# Patient Record
Sex: FEMALE | Race: BLACK OR AFRICAN AMERICAN | NOT HISPANIC OR LATINO
[De-identification: names, ages, dates, MRNs, and addresses within clinical notes are randomized per-mention and may not be internally consistent; named-entity substitution may affect disease eponyms.]

---

## 2021-01-06 ENCOUNTER — APPOINTMENT (OUTPATIENT)
Dept: SURGERY | Facility: CLINIC | Age: 31
End: 2021-01-06
Payer: MEDICAID

## 2021-01-06 VITALS
HEIGHT: 64 IN | OXYGEN SATURATION: 99 % | TEMPERATURE: 96.9 F | BODY MASS INDEX: 31.07 KG/M2 | SYSTOLIC BLOOD PRESSURE: 125 MMHG | WEIGHT: 182 LBS | DIASTOLIC BLOOD PRESSURE: 81 MMHG | HEART RATE: 71 BPM

## 2021-01-06 DIAGNOSIS — K42.0 UMBILICAL HERNIA WITH OBSTRUCTION, W/OUT GANGRENE: ICD-10-CM

## 2021-01-06 PROBLEM — Z00.00 ENCOUNTER FOR PREVENTIVE HEALTH EXAMINATION: Status: ACTIVE | Noted: 2021-01-06

## 2021-01-06 PROCEDURE — 99072 ADDL SUPL MATRL&STAF TM PHE: CPT

## 2021-01-06 PROCEDURE — 99203 OFFICE O/P NEW LOW 30 MIN: CPT

## 2021-01-06 NOTE — HISTORY OF PRESENT ILLNESS
[de-identified] : 31 yo female with h/o  x3 as well as a known umbilical hernia presents today for evaluation of increased pain around the umbilicus. She denies any nausea or vomiting. No issues with BM's. She tolerates a diet without issues. Patient states she present to [facility] and diagnosed with an umbilical hernia. She had another CT at [facility] which revealed  an umbilical hernia as well as a lower abdominal wall hernia containing bowel.

## 2021-01-06 NOTE — PHYSICAL EXAM
[Respiratory Effort] : normal respiratory effort [Alert] : alert [Oriented to Person] : oriented to person [Oriented to Place] : oriented to place [Oriented to Time] : oriented to time [Calm] : calm [JVD] : no jugular venous distention  [Abdominal Masses] : No abdominal masses [Abdomen Tenderness] : ~T ~M No abdominal tenderness [de-identified] : FALGUNI DENISE NAD [de-identified] : EOMI [de-identified] : soft NT ND + incarcerated umbilical hernia\par no lower abdominal wall defects appreciated

## 2021-01-06 NOTE — ASSESSMENT
[FreeTextEntry1] : 31 yo female in pain due to an incarcerated umbilical hernia. Will schedule out patient repair. CT findings do not correlate with PE findings.

## 2021-01-08 DIAGNOSIS — Z01.818 ENCOUNTER FOR OTHER PREPROCEDURAL EXAMINATION: ICD-10-CM

## 2021-01-09 ENCOUNTER — APPOINTMENT (OUTPATIENT)
Dept: DISASTER EMERGENCY | Facility: CLINIC | Age: 31
End: 2021-01-09

## 2021-01-09 ENCOUNTER — LABORATORY RESULT (OUTPATIENT)
Age: 31
End: 2021-01-09

## 2021-01-11 ENCOUNTER — TRANSCRIPTION ENCOUNTER (OUTPATIENT)
Age: 31
End: 2021-01-11

## 2021-01-12 ENCOUNTER — OUTPATIENT (OUTPATIENT)
Dept: OUTPATIENT SERVICES | Facility: HOSPITAL | Age: 31
LOS: 1 days | Discharge: HOME | End: 2021-01-12
Payer: MEDICAID

## 2021-01-12 ENCOUNTER — APPOINTMENT (OUTPATIENT)
Dept: SURGERY | Facility: HOSPITAL | Age: 31
End: 2021-01-12

## 2021-01-12 ENCOUNTER — RESULT REVIEW (OUTPATIENT)
Age: 31
End: 2021-01-12

## 2021-01-12 VITALS
RESPIRATION RATE: 15 BRPM | OXYGEN SATURATION: 98 % | TEMPERATURE: 98 F | DIASTOLIC BLOOD PRESSURE: 96 MMHG | HEART RATE: 68 BPM | SYSTOLIC BLOOD PRESSURE: 135 MMHG

## 2021-01-12 VITALS
DIASTOLIC BLOOD PRESSURE: 86 MMHG | RESPIRATION RATE: 15 BRPM | SYSTOLIC BLOOD PRESSURE: 124 MMHG | HEART RATE: 72 BPM | OXYGEN SATURATION: 100 %

## 2021-01-12 DIAGNOSIS — Z98.891 HISTORY OF UTERINE SCAR FROM PREVIOUS SURGERY: Chronic | ICD-10-CM

## 2021-01-12 LAB — HCG UR QL: NEGATIVE — SIGNIFICANT CHANGE UP

## 2021-01-12 PROCEDURE — 22902 EXC ABD LES SC < 3 CM: CPT | Mod: AS

## 2021-01-12 PROCEDURE — 22900 EXC ABDL TUM DEEP < 5 CM: CPT

## 2021-01-12 PROCEDURE — 49585: CPT

## 2021-01-12 PROCEDURE — 49585: CPT | Mod: AS

## 2021-01-12 PROCEDURE — 88305 TISSUE EXAM BY PATHOLOGIST: CPT | Mod: 26

## 2021-01-12 RX ORDER — ACETAMINOPHEN 500 MG
1000 TABLET ORAL ONCE
Refills: 0 | Status: COMPLETED | OUTPATIENT
Start: 2021-01-12 | End: 2021-01-12

## 2021-01-12 RX ORDER — SODIUM CHLORIDE 9 MG/ML
1000 INJECTION, SOLUTION INTRAVENOUS
Refills: 0 | Status: DISCONTINUED | OUTPATIENT
Start: 2021-01-12 | End: 2021-01-12

## 2021-01-12 RX ORDER — FENTANYL CITRATE 50 UG/ML
25 INJECTION INTRAVENOUS
Refills: 0 | Status: DISCONTINUED | OUTPATIENT
Start: 2021-01-12 | End: 2021-01-12

## 2021-01-12 RX ORDER — IBUPROFEN 200 MG
1 TABLET ORAL
Qty: 56 | Refills: 0
Start: 2021-01-12 | End: 2021-01-25

## 2021-01-12 RX ORDER — ONDANSETRON 8 MG/1
4 TABLET, FILM COATED ORAL ONCE
Refills: 0 | Status: COMPLETED | OUTPATIENT
Start: 2021-01-12 | End: 2021-01-12

## 2021-01-12 RX ORDER — FENTANYL CITRATE 50 UG/ML
50 INJECTION INTRAVENOUS
Refills: 0 | Status: DISCONTINUED | OUTPATIENT
Start: 2021-01-12 | End: 2021-01-12

## 2021-01-12 RX ORDER — OXYCODONE HYDROCHLORIDE 5 MG/1
5 TABLET ORAL ONCE
Refills: 0 | Status: DISCONTINUED | OUTPATIENT
Start: 2021-01-12 | End: 2021-01-12

## 2021-01-12 RX ORDER — ACETAMINOPHEN 500 MG
2 TABLET ORAL
Qty: 80 | Refills: 0
Start: 2021-01-12 | End: 2021-01-21

## 2021-01-12 RX ADMIN — FENTANYL CITRATE 50 MICROGRAM(S): 50 INJECTION INTRAVENOUS at 12:49

## 2021-01-12 RX ADMIN — Medication 400 MILLIGRAM(S): at 12:59

## 2021-01-12 RX ADMIN — SODIUM CHLORIDE 75 MILLILITER(S): 9 INJECTION, SOLUTION INTRAVENOUS at 12:59

## 2021-01-12 RX ADMIN — ONDANSETRON 4 MILLIGRAM(S): 8 TABLET, FILM COATED ORAL at 13:47

## 2021-01-12 NOTE — BRIEF OPERATIVE NOTE - NSICDXBRIEFPOSTOP_GEN_ALL_CORE_FT
POST-OP DIAGNOSIS:  Abdominal wall mass 12-Jan-2021 12:53:46  Evaristo Hutton  Umbilical hernia 12-Jan-2021 12:53:24  Evaristo Hutton

## 2021-01-12 NOTE — H&P ADULT - HISTORY OF PRESENT ILLNESS
31 yo healthy female with h/o  x3 presents for repair of a symptomatic umbilical hernia.   (see allscripts chart for details).

## 2021-01-12 NOTE — ASU DISCHARGE PLAN (ADULT/PEDIATRIC) - CALL YOUR DOCTOR IF YOU HAVE ANY OF THE FOLLOWING:
Bleeding that does not stop/Swelling that gets worse/Fever greater than (need to indicate Fahrenheit or Celsius)/Wound/Surgical Site with redness, or foul smelling discharge or pus

## 2021-01-12 NOTE — ASU DISCHARGE PLAN (ADULT/PEDIATRIC) - ASU DC SPECIAL INSTRUCTIONSFT
ice packs on-off for 24hrs    f/u 1 week with Dr. Snyder    take pain medication together every 6hrs as needed for severe pain

## 2021-01-12 NOTE — BRIEF OPERATIVE NOTE - NSICDXBRIEFPROCEDURE_GEN_ALL_CORE_FT
PROCEDURES:  Excisional biopsy, mass, abdominal wall 12-Jan-2021 12:53:10  Evaristo Hutton  Repair, hernia, umbilical, adult 12-Jan-2021 12:52:57  Evaristo Hutton

## 2021-01-13 LAB — SURGICAL PATHOLOGY STUDY: SIGNIFICANT CHANGE UP

## 2021-01-18 DIAGNOSIS — N80.8 OTHER ENDOMETRIOSIS: ICD-10-CM

## 2021-01-18 DIAGNOSIS — E66.9 OBESITY, UNSPECIFIED: ICD-10-CM

## 2021-01-18 DIAGNOSIS — K42.9 UMBILICAL HERNIA WITHOUT OBSTRUCTION OR GANGRENE: ICD-10-CM

## 2021-01-18 DIAGNOSIS — J45.909 UNSPECIFIED ASTHMA, UNCOMPLICATED: ICD-10-CM

## 2021-01-18 DIAGNOSIS — R22.2 LOCALIZED SWELLING, MASS AND LUMP, TRUNK: ICD-10-CM

## 2021-01-20 ENCOUNTER — APPOINTMENT (OUTPATIENT)
Dept: SURGERY | Facility: CLINIC | Age: 31
End: 2021-01-20
Payer: MEDICAID

## 2021-01-20 VITALS — TEMPERATURE: 97.8 F | HEART RATE: 77 BPM | OXYGEN SATURATION: 98 %

## 2021-01-20 PROBLEM — Z78.9 OTHER SPECIFIED HEALTH STATUS: Chronic | Status: ACTIVE | Noted: 2021-01-12

## 2021-01-20 PROCEDURE — 99024 POSTOP FOLLOW-UP VISIT: CPT

## 2021-02-03 ENCOUNTER — APPOINTMENT (OUTPATIENT)
Dept: SURGERY | Facility: CLINIC | Age: 31
End: 2021-02-03

## 2025-06-20 ENCOUNTER — APPOINTMENT (OUTPATIENT)
Dept: ANTEPARTUM | Facility: CLINIC | Age: 35
End: 2025-06-20

## 2025-06-20 ENCOUNTER — APPOINTMENT (OUTPATIENT)
Dept: MATERNAL FETAL MEDICINE | Facility: CLINIC | Age: 35
End: 2025-06-20

## 2025-06-20 PROCEDURE — 76805 OB US >/= 14 WKS SNGL FETUS: CPT

## 2025-06-20 PROCEDURE — 76817 TRANSVAGINAL US OBSTETRIC: CPT

## 2025-06-20 PROCEDURE — 99205 OFFICE O/P NEW HI 60 MIN: CPT | Mod: 25

## 2025-06-20 PROCEDURE — ZZZZZ: CPT

## 2025-06-23 ENCOUNTER — NON-APPOINTMENT (OUTPATIENT)
Age: 35
End: 2025-06-23

## 2025-06-27 ENCOUNTER — NON-APPOINTMENT (OUTPATIENT)
Age: 35
End: 2025-06-27

## 2025-07-01 ENCOUNTER — APPOINTMENT (OUTPATIENT)
Dept: OBGYN | Facility: HOSPITAL | Age: 35
End: 2025-07-01
Payer: MEDICAID

## 2025-07-01 ENCOUNTER — APPOINTMENT (OUTPATIENT)
Dept: ANTEPARTUM | Facility: CLINIC | Age: 35
End: 2025-07-01

## 2025-07-01 ENCOUNTER — RESULT REVIEW (OUTPATIENT)
Age: 35
End: 2025-07-01

## 2025-07-01 ENCOUNTER — APPOINTMENT (OUTPATIENT)
Dept: MATERNAL FETAL MEDICINE | Facility: CLINIC | Age: 35
End: 2025-07-01

## 2025-07-01 ENCOUNTER — ASOB RESULT (OUTPATIENT)
Age: 35
End: 2025-07-01

## 2025-07-01 ENCOUNTER — APPOINTMENT (OUTPATIENT)
Dept: ANTEPARTUM | Facility: CLINIC | Age: 35
End: 2025-07-01
Payer: MEDICAID

## 2025-07-01 ENCOUNTER — OUTPATIENT (OUTPATIENT)
Dept: OUTPATIENT SERVICES | Facility: HOSPITAL | Age: 35
LOS: 1 days | End: 2025-07-01

## 2025-07-01 VITALS
TEMPERATURE: 97.9 F | HEIGHT: 64 IN | HEART RATE: 82 BPM | WEIGHT: 239 LBS | SYSTOLIC BLOOD PRESSURE: 123 MMHG | DIASTOLIC BLOOD PRESSURE: 80 MMHG | BODY MASS INDEX: 40.8 KG/M2

## 2025-07-01 DIAGNOSIS — Z98.891 HISTORY OF UTERINE SCAR FROM PREVIOUS SURGERY: Chronic | ICD-10-CM

## 2025-07-01 PROBLEM — Z78.9 NON-SMOKER: Status: ACTIVE | Noted: 2025-07-01

## 2025-07-01 PROBLEM — Z78.9 NO HISTORY OF ALCOHOL USE: Status: ACTIVE | Noted: 2025-07-01

## 2025-07-01 PROBLEM — N96 RECURRENT PREGNANCY LOSS: Status: RESOLVED | Noted: 2025-07-01 | Resolved: 2025-07-01

## 2025-07-01 LAB
24R-OH-CALCIDIOL SERPL-MCNC: 24 NG/ML — SIGNIFICANT CHANGE UP
ALBUMIN SERPL ELPH-MCNC: 3.2 G/DL — LOW (ref 3.3–5)
ALP SERPL-CCNC: 69 U/L — SIGNIFICANT CHANGE UP (ref 40–120)
ALT FLD-CCNC: 17 U/L — SIGNIFICANT CHANGE UP (ref 4–33)
ANION GAP SERPL CALC-SCNC: 12 MMOL/L — SIGNIFICANT CHANGE UP (ref 7–14)
APPEARANCE UR: CLEAR — SIGNIFICANT CHANGE UP
APTT BLD: 26.8 SEC — SIGNIFICANT CHANGE UP (ref 26.1–36.8)
AST SERPL-CCNC: 17 U/L — SIGNIFICANT CHANGE UP (ref 4–32)
BACTERIA # UR AUTO: NEGATIVE /HPF — SIGNIFICANT CHANGE UP
BILIRUB SERPL-MCNC: <0.2 MG/DL — SIGNIFICANT CHANGE UP (ref 0.2–1.2)
BILIRUB UR-MCNC: NEGATIVE — SIGNIFICANT CHANGE UP
BUN SERPL-MCNC: 7 MG/DL — SIGNIFICANT CHANGE UP (ref 7–23)
CALCIUM SERPL-MCNC: 8.3 MG/DL — LOW (ref 8.4–10.5)
CAST: 0 /LPF — SIGNIFICANT CHANGE UP (ref 0–4)
CHLORIDE SERPL-SCNC: 102 MMOL/L — SIGNIFICANT CHANGE UP (ref 98–107)
CO2 SERPL-SCNC: 21 MMOL/L — LOW (ref 22–31)
COLOR SPEC: YELLOW — SIGNIFICANT CHANGE UP
CREAT SERPL-MCNC: 0.66 MG/DL — SIGNIFICANT CHANGE UP (ref 0.5–1.3)
DIFF PNL FLD: NEGATIVE — SIGNIFICANT CHANGE UP
DRVVT RATIO: 1.11 RATIO — SIGNIFICANT CHANGE UP (ref 0–1.21)
DRVVT SCREEN TO CONFIRM RATIO: NEGATIVE — SIGNIFICANT CHANGE UP
EGFR: 116 ML/MIN/1.73M2 — SIGNIFICANT CHANGE UP
EGFR: 116 ML/MIN/1.73M2 — SIGNIFICANT CHANGE UP
GLUCOSE 1H P MEAL SERPL-MCNC: 117 MG/DL — SIGNIFICANT CHANGE UP (ref 70–134)
GLUCOSE SERPL-MCNC: 114 MG/DL — HIGH (ref 70–99)
GLUCOSE UR QL: NEGATIVE MG/DL — SIGNIFICANT CHANGE UP
HCT VFR BLD CALC: 31.2 % — LOW (ref 34.5–45)
HGB BLD-MCNC: 10 G/DL — LOW (ref 11.5–15.5)
KETONES UR QL: ABNORMAL MG/DL
LEUKOCYTE ESTERASE UR-ACNC: ABNORMAL
MCHC RBC-ENTMCNC: 27.9 PG — SIGNIFICANT CHANGE UP (ref 27–34)
MCHC RBC-ENTMCNC: 32.1 G/DL — SIGNIFICANT CHANGE UP (ref 32–36)
MCV RBC AUTO: 86.9 FL — SIGNIFICANT CHANGE UP (ref 80–100)
NITRITE UR-MCNC: NEGATIVE — SIGNIFICANT CHANGE UP
NORMALIZED SCT PPP-RTO: 0.94 RATIO — SIGNIFICANT CHANGE UP
NORMALIZED SCT PPP-RTO: NEGATIVE — SIGNIFICANT CHANGE UP
NRBC # BLD AUTO: 0 K/UL — SIGNIFICANT CHANGE UP (ref 0–0)
NRBC # FLD: 0 K/UL — SIGNIFICANT CHANGE UP (ref 0–0)
NRBC BLD AUTO-RTO: 0 /100 WBCS — SIGNIFICANT CHANGE UP (ref 0–0)
PH UR: 6.5 — SIGNIFICANT CHANGE UP (ref 5–8)
PLATELET # BLD AUTO: 349 K/UL — SIGNIFICANT CHANGE UP (ref 150–400)
PMV BLD: 9.2 FL — SIGNIFICANT CHANGE UP (ref 7–13)
POTASSIUM SERPL-MCNC: 3.2 MMOL/L — LOW (ref 3.5–5.3)
POTASSIUM SERPL-SCNC: 3.2 MMOL/L — LOW (ref 3.5–5.3)
PROT SERPL-MCNC: 6.3 G/DL — SIGNIFICANT CHANGE UP (ref 6–8.3)
PROT UR-MCNC: SIGNIFICANT CHANGE UP MG/DL
RBC # BLD: 3.59 M/UL — LOW (ref 3.8–5.2)
RBC # FLD: 15.1 % — HIGH (ref 10.3–14.5)
RBC CASTS # UR COMP ASSIST: 0 /HPF — SIGNIFICANT CHANGE UP (ref 0–4)
SODIUM SERPL-SCNC: 135 MMOL/L — SIGNIFICANT CHANGE UP (ref 135–145)
SP GR SPEC: 1.02 — SIGNIFICANT CHANGE UP (ref 1–1.03)
SPIN AND FREEZE: SIGNIFICANT CHANGE UP
SQUAMOUS # UR AUTO: 4 /HPF — SIGNIFICANT CHANGE UP (ref 0–5)
T PALLIDUM AB TITR SER: NEGATIVE — SIGNIFICANT CHANGE UP
URATE SERPL-MCNC: 2.7 MG/DL — SIGNIFICANT CHANGE UP (ref 2.5–7)
UROBILINOGEN FLD QL: 1 MG/DL — SIGNIFICANT CHANGE UP (ref 0.2–1)
WBC # BLD: 8.42 K/UL — SIGNIFICANT CHANGE UP (ref 3.8–10.5)
WBC # FLD AUTO: 8.42 K/UL — SIGNIFICANT CHANGE UP (ref 3.8–10.5)
WBC UR QL: 13 /HPF — HIGH (ref 0–5)

## 2025-07-01 PROCEDURE — 76816 OB US FOLLOW-UP PER FETUS: CPT

## 2025-07-01 PROCEDURE — 99213 OFFICE O/P EST LOW 20 MIN: CPT

## 2025-07-01 PROCEDURE — 99215 OFFICE O/P EST HI 40 MIN: CPT

## 2025-07-01 PROCEDURE — 99215 OFFICE O/P EST HI 40 MIN: CPT | Mod: 25

## 2025-07-01 PROCEDURE — 76820 UMBILICAL ARTERY ECHO: CPT | Mod: 59

## 2025-07-01 PROCEDURE — 76819 FETAL BIOPHYS PROFIL W/O NST: CPT | Mod: 59

## 2025-07-02 DIAGNOSIS — O35.EXX0 MATERNAL CARE FOR OTHER (SUSPECTED) FETAL ABNORMALITY AND DAMAGE, FETAL GENITOURINARY ANOMALIES, NOT APPLICABLE OR UNSPECIFIED: ICD-10-CM

## 2025-07-02 DIAGNOSIS — O09.93 SUPERVISION OF HIGH RISK PREGNANCY, UNSPECIFIED, THIRD TRIMESTER: ICD-10-CM

## 2025-07-02 DIAGNOSIS — Z78.9 OTHER SPECIFIED HEALTH STATUS: ICD-10-CM

## 2025-07-02 DIAGNOSIS — N96 RECURRENT PREGNANCY LOSS: ICD-10-CM

## 2025-07-02 LAB
B2 GLYCOPROT1 IGA SER QL: <2 U/ML — SIGNIFICANT CHANGE UP
B2 GLYCOPROT1 IGG SER-ACNC: <1.4 U/ML — SIGNIFICANT CHANGE UP
B2 GLYCOPROT1 IGM SER-ACNC: <1.5 U/ML — SIGNIFICANT CHANGE UP
CARDIOLIPIN IGM SER-MCNC: <1.5 MPL U/ML — SIGNIFICANT CHANGE UP
CARDIOLIPIN IGM SER-MCNC: <1.6 GPL U/ML — SIGNIFICANT CHANGE UP
CULTURE RESULTS: SIGNIFICANT CHANGE UP
DEPRECATED CARDIOLIPIN IGA SER: <2 APL U/ML — SIGNIFICANT CHANGE UP
MEV IGG SER-ACNC: 151 AU/ML — SIGNIFICANT CHANGE UP
MEV IGG+IGM SER-IMP: POSITIVE — SIGNIFICANT CHANGE UP
RUBV IGG SER-ACNC: 4.18 INDEX — SIGNIFICANT CHANGE UP
RUBV IGG SER-IMP: POSITIVE — SIGNIFICANT CHANGE UP
SPECIMEN SOURCE: SIGNIFICANT CHANGE UP
VZV IGG FLD QL IA: 10.8 S/CO — SIGNIFICANT CHANGE UP
VZV IGG FLD QL IA: POSITIVE — SIGNIFICANT CHANGE UP

## 2025-07-03 ENCOUNTER — APPOINTMENT (OUTPATIENT)
Dept: PEDIATRIC NEPHROLOGY | Facility: CLINIC | Age: 35
End: 2025-07-03

## 2025-07-03 PROCEDURE — 99205 OFFICE O/P NEW HI 60 MIN: CPT | Mod: 95

## 2025-07-07 ENCOUNTER — APPOINTMENT (OUTPATIENT)
Dept: MATERNAL FETAL MEDICINE | Facility: HOSPITAL | Age: 35
End: 2025-07-07

## 2025-07-07 ENCOUNTER — APPOINTMENT (OUTPATIENT)
Dept: ANTEPARTUM | Facility: CLINIC | Age: 35
End: 2025-07-07

## 2025-07-07 ENCOUNTER — APPOINTMENT (OUTPATIENT)
Dept: ANTEPARTUM | Facility: HOSPITAL | Age: 35
End: 2025-07-07

## 2025-07-07 ENCOUNTER — INPATIENT (INPATIENT)
Facility: HOSPITAL | Age: 35
LOS: 2 days | Discharge: HOME | End: 2025-07-10
Attending: SPECIALIST | Admitting: SPECIALIST
Payer: MEDICAID

## 2025-07-07 ENCOUNTER — ASOB RESULT (OUTPATIENT)
Age: 35
End: 2025-07-07

## 2025-07-07 ENCOUNTER — NON-APPOINTMENT (OUTPATIENT)
Age: 35
End: 2025-07-07

## 2025-07-07 VITALS
RESPIRATION RATE: 16 BRPM | DIASTOLIC BLOOD PRESSURE: 69 MMHG | HEART RATE: 96 BPM | SYSTOLIC BLOOD PRESSURE: 113 MMHG | TEMPERATURE: 98 F

## 2025-07-07 DIAGNOSIS — O26.899 OTHER SPECIFIED PREGNANCY RELATED CONDITIONS, UNSPECIFIED TRIMESTER: ICD-10-CM

## 2025-07-07 DIAGNOSIS — O46.90 ANTEPARTUM HEMORRHAGE, UNSPECIFIED, UNSPECIFIED TRIMESTER: ICD-10-CM

## 2025-07-07 DIAGNOSIS — O44.00 COMPLETE PLACENTA PREVIA NOS OR WITHOUT HEMORRHAGE, UNSPECIFIED TRIMESTER: ICD-10-CM

## 2025-07-07 DIAGNOSIS — N39.0 URINARY TRACT INFECTION, SITE NOT SPECIFIED: ICD-10-CM

## 2025-07-07 DIAGNOSIS — O34.219 MATERNAL CARE FOR UNSPECIFIED TYPE SCAR FROM PREVIOUS CESAREAN DELIVERY: ICD-10-CM

## 2025-07-07 DIAGNOSIS — Z98.891 HISTORY OF UTERINE SCAR FROM PREVIOUS SURGERY: Chronic | ICD-10-CM

## 2025-07-07 LAB
APPEARANCE UR: ABNORMAL
APTT BLD: 25.6 SEC — LOW (ref 26.1–36.8)
BACTERIA # UR AUTO: ABNORMAL /HPF
BASOPHILS # BLD AUTO: 0.03 K/UL — SIGNIFICANT CHANGE UP (ref 0–0.2)
BASOPHILS NFR BLD AUTO: 0.3 % — SIGNIFICANT CHANGE UP (ref 0–2)
BILIRUB UR-MCNC: ABNORMAL
BLD GP AB SCN SERPL QL: NEGATIVE — SIGNIFICANT CHANGE UP
COLOR SPEC: ABNORMAL
DIFF PNL FLD: ABNORMAL
EOSINOPHIL # BLD AUTO: 0.04 K/UL — SIGNIFICANT CHANGE UP (ref 0–0.5)
EOSINOPHIL NFR BLD AUTO: 0.4 % — SIGNIFICANT CHANGE UP (ref 0–6)
EPI CELLS # UR: PRESENT
FIBRINOGEN PPP-MCNC: 476 MG/DL — HIGH (ref 200–465)
GLUCOSE UR QL: NEGATIVE MG/DL — SIGNIFICANT CHANGE UP
HCT VFR BLD CALC: 30.8 % — LOW (ref 34.5–45)
HGB BLD-MCNC: 10.1 G/DL — LOW (ref 11.5–15.5)
IMM GRANULOCYTES # BLD AUTO: 0.05 K/UL — SIGNIFICANT CHANGE UP (ref 0–0.07)
IMM GRANULOCYTES NFR BLD AUTO: 0.6 % — SIGNIFICANT CHANGE UP (ref 0–0.9)
INR BLD: 0.92 RATIO — SIGNIFICANT CHANGE UP (ref 0.85–1.16)
KETONES UR QL: NEGATIVE MG/DL — SIGNIFICANT CHANGE UP
LEUKOCYTE ESTERASE UR-ACNC: ABNORMAL
LYMPHOCYTES # BLD AUTO: 1.2 K/UL — SIGNIFICANT CHANGE UP (ref 1–3.3)
LYMPHOCYTES NFR BLD AUTO: 13.3 % — SIGNIFICANT CHANGE UP (ref 13–44)
MCHC RBC-ENTMCNC: 27.7 PG — SIGNIFICANT CHANGE UP (ref 27–34)
MCHC RBC-ENTMCNC: 32.8 G/DL — SIGNIFICANT CHANGE UP (ref 32–36)
MCV RBC AUTO: 84.6 FL — SIGNIFICANT CHANGE UP (ref 80–100)
MONOCYTES # BLD AUTO: 0.72 K/UL — SIGNIFICANT CHANGE UP (ref 0–0.9)
MONOCYTES NFR BLD AUTO: 8 % — SIGNIFICANT CHANGE UP (ref 2–14)
NEUTROPHILS # BLD AUTO: 6.98 K/UL — SIGNIFICANT CHANGE UP (ref 1.8–7.4)
NEUTROPHILS NFR BLD AUTO: 77.4 % — HIGH (ref 43–77)
NITRITE UR-MCNC: POSITIVE
NRBC # BLD AUTO: 0 K/UL — SIGNIFICANT CHANGE UP (ref 0–0)
NRBC # FLD: 0 K/UL — SIGNIFICANT CHANGE UP (ref 0–0)
NRBC BLD AUTO-RTO: 0 /100 WBCS — SIGNIFICANT CHANGE UP (ref 0–0)
PH UR: 6.5 — SIGNIFICANT CHANGE UP (ref 5–8)
PLATELET # BLD AUTO: 325 K/UL — SIGNIFICANT CHANGE UP (ref 150–400)
PMV BLD: 8.8 FL — SIGNIFICANT CHANGE UP (ref 7–13)
PROT UR-MCNC: 300 MG/DL
PROTHROM AB SERPL-ACNC: 11 SEC — SIGNIFICANT CHANGE UP (ref 9.9–13.4)
RBC # BLD: 3.64 M/UL — LOW (ref 3.8–5.2)
RBC # FLD: 15.1 % — HIGH (ref 10.3–14.5)
RBC CASTS # UR COMP ASSIST: SIGNIFICANT CHANGE UP /HPF (ref 0–4)
RH IG SCN BLD-IMP: POSITIVE — SIGNIFICANT CHANGE UP
SP GR SPEC: 1.01 — SIGNIFICANT CHANGE UP (ref 1–1.03)
UROBILINOGEN FLD QL: 0.2 MG/DL — SIGNIFICANT CHANGE UP (ref 0.2–1)
WBC # BLD: 9.02 K/UL — SIGNIFICANT CHANGE UP (ref 3.8–10.5)
WBC # FLD AUTO: 9.02 K/UL — SIGNIFICANT CHANGE UP (ref 3.8–10.5)
WBC UR QL: SIGNIFICANT CHANGE UP /HPF (ref 0–5)

## 2025-07-07 PROCEDURE — 76817 TRANSVAGINAL US OBSTETRIC: CPT | Mod: 26

## 2025-07-07 PROCEDURE — 76819 FETAL BIOPHYS PROFIL W/O NST: CPT | Mod: 26

## 2025-07-07 PROCEDURE — 76820 UMBILICAL ARTERY ECHO: CPT | Mod: 26

## 2025-07-07 RX ORDER — SODIUM CHLORIDE 9 G/1000ML
1000 INJECTION, SOLUTION INTRAVENOUS
Refills: 0 | Status: DISCONTINUED | OUTPATIENT
Start: 2025-07-07 | End: 2025-07-07

## 2025-07-07 RX ORDER — CEFTRIAXONE 500 MG/1
INJECTION, POWDER, FOR SOLUTION INTRAMUSCULAR; INTRAVENOUS
Refills: 0 | Status: DISCONTINUED | OUTPATIENT
Start: 2025-07-07 | End: 2025-07-10

## 2025-07-07 RX ORDER — CEFTRIAXONE 500 MG/1
1000 INJECTION, POWDER, FOR SOLUTION INTRAMUSCULAR; INTRAVENOUS EVERY 24 HOURS
Refills: 0 | Status: DISCONTINUED | OUTPATIENT
Start: 2025-07-08 | End: 2025-07-10

## 2025-07-07 RX ORDER — CEFTRIAXONE 500 MG/1
1000 INJECTION, POWDER, FOR SOLUTION INTRAMUSCULAR; INTRAVENOUS ONCE
Refills: 0 | Status: COMPLETED | OUTPATIENT
Start: 2025-07-07 | End: 2025-07-07

## 2025-07-07 RX ORDER — ALBUTEROL SULFATE 2.5 MG/3ML
2 VIAL, NEBULIZER (ML) INHALATION EVERY 6 HOURS
Refills: 0 | Status: DISCONTINUED | OUTPATIENT
Start: 2025-07-07 | End: 2025-07-10

## 2025-07-07 RX ADMIN — CEFTRIAXONE 100 MILLIGRAM(S): 500 INJECTION, POWDER, FOR SOLUTION INTRAMUSCULAR; INTRAVENOUS at 16:16

## 2025-07-07 NOTE — OB RN PATIENT PROFILE - NS_OBGYNHISTORY_OBGYN_ALL_OB_FT
sab x3 complete  8/2/11 primary csection Failure to progress 7+  7/20/12 FT repeat 8+  4/16/15 FT repeat 7+    AP course complicated by fetal alert:   7/2/25 - Maternal - Hx of 3 prior cesareans.  Anterior placenta previa. Fetal - FGR, cardiomegaly, pericardial effusion, small stomach bubble, once kidney is multicystic and enlarged, contralateral kidney not visualized.  Anhydramnios. NIPS positive for Trisomy 15. -Bri Krishnamurthy, RNC  Last MFM US from 7/1 reveals:   EFW at 11th percentile with AC at 5th%, 890gm  Cardiomegaly and pericardial effusion observed  Small stomach bubble  One kidney multicystic and enlarged, contralateral kidney not visualized  Anhydramnios  Single umbilical artery  BPP 6/8, -2 for fluid, anterior previa  Doppler WNL  No sonographic evidence of previa or invasive placentation.   Had Peds Nephrology consult 7/3  For MRI on 7/11  GBS unknown

## 2025-07-07 NOTE — CONSULT NOTE ADULT - ASSESSMENT
36 yo  (prior CD X 3, AB X 3 ) at 27w6d with anterior placenta previa with Trisomy 15 on NIPS with fetal cardiomegaly, pericardial effusion, multicystic kidneys and anhydramnios came in for vaginal bleeidng. VS stable, vaginal bleeding significantly decreased now.  Patient extensively counselled today regarding fetal prognosis with positive Trisomy 15 on NIPS with fetal cardiomegaly, pericardial effusion, multicystic kidneys and anhydramnios at this gestational age. Patient declines termination of pregnancy. Counselled that she has underlying anterior placenta previa and PAS placenta accreta syndrome) cannot be ruled out till further work up is done. In case she ends up in CD again in this pregnancy for fetal indications without full work up for ruling ou PAS, it would be a risk to the patients' life. She understands and agrees to get full work up for PAS including fetal MRI for placenta invasiveness and fetal ECHO before proceeding with fetal monitoring . Comfort care discussed with the patient as well. All questions were answered and she agreed to hold off for fetal monitoring until work up fro PAS is completed      Recommendation  -Fetal MRI abdomen/pelvis to rule out PAS  -Fetal ECHO  -NICU consult   -No fetal monitoring till work up for PAS is completed  -Regular diet     Patient seen and counselled with Dr. Shi-ELIJAH Attending     Danielle Shukla-ELIJAH fellow -PGY 5

## 2025-07-07 NOTE — OB PROVIDER H&P - NS_OBGYNHISTORY_OBGYN_ALL_OB_FT
sab x3  8/2/11 primary csection Failure to progress 7+  7/20/12 FT repeat 8+  4/16/15 FT repeat 7+    AP course complicated by fetal alert:   7/2/25 - Maternal - Hx of 3 prior cesareans.  Anterior placenta previa. Fetal - FGR, cardiomegaly, pericardial effusion, small stomach bubble, once kidney is multicystic and enlarged, contralateral kidney not visualized.  Anhydramnios. NIPS positive for Trisomy 15. -Bri Krishnamurthy, RNC  Last M US from 7/1 reveals:   EFW at 11th percentile with AC at 5th%, 890gm  Cardiomegaly and pericardial effusion observed  Small stomach bubble  One kidney multicystic and enlarged, contralateral kidney not visualized  Anhydramnios  Single umbilical artery  BPP 6/8, -2 for fluid, anterior previa  Doppler WNL  No sonographic evidence of previa or invasive placentation.   Had Peds Nephrology consult 7/3  For MRI on 7/11  GBS unknown sab x3 complete  8/2/11 primary csection Failure to progress 7+  7/20/12 FT repeat 8+  4/16/15 FT repeat 7+    AP course complicated by fetal alert:   7/2/25 - Maternal - Hx of 3 prior cesareans.  Anterior placenta previa. Fetal - FGR, cardiomegaly, pericardial effusion, small stomach bubble, once kidney is multicystic and enlarged, contralateral kidney not visualized.  Anhydramnios. NIPS positive for Trisomy 15. -Bri Krishnamurthy, RNC  Last MFM US from 7/1 reveals:   EFW at 11th percentile with AC at 5th%, 890gm  Cardiomegaly and pericardial effusion observed  Small stomach bubble  One kidney multicystic and enlarged, contralateral kidney not visualized  Anhydramnios  Single umbilical artery  BPP 6/8, -2 for fluid, anterior previa  Doppler WNL  No sonographic evidence of previa or invasive placentation.   Had Peds Nephrology consult 7/3  For MRI on 7/11  GBS unknown

## 2025-07-07 NOTE — OB PROVIDER H&P - HISTORY OF PRESENT ILLNESS
36yo  @ 27.6 presents with c/o vaginal bleeding since this morning when wiping. Denies pain, recent intercourse, dysuria. Patient presented today for routine prenatal care in [facility] and told them her complaint and was sent to triage without being seen.   Patient is transfer of care from [facility] for multiple fetal concerns.   Had first clinic appointment on   AP course complicated by fetal alert:   25 - Maternal - Hx of 3 prior cesareans.  Anterior placenta previa. Fetal - FGR, cardiomegaly, pericardial effusion, small stomach bubble, once kidney is multicystic and enlarged, contralateral kidney not visualized.  Anhydramnios. NIPS positive for Trisomy 15. -Bri Krishnamurthy, BRENDAC  Last MFM US from  reveals:   EFW at 11th percentile with AC at 5th%, 890gm  Cardiomegaly and pericardial effusion observed  Small stomach bubble  One kidney multicystic and enlarged, contralateral kidney not visualized  Anhydramnios  Single umbilical artery  BPP 6/8, -2 for fluid, anterior previa  Doppler WNL  No sonographic evidence of previa or invasive placentation.   Had Peds Nephrology consult 7/3  For MRI on     H/O Asthma- last used rescue inhaler 1 month ago  C/S X 3 34yo  @ 27.6 presents with c/o vaginal bleeding since this morning when wiping. Denies pain, recent intercourse, dysuria. Patient presented today for routine prenatal care in [facility] and told them her complaint and was sent to triage without being seen.   Patient is transfer of care from [facility] for multiple fetal concerns.   Had first clinic appointment on   AP course complicated by fetal alert:   25 - Maternal - Hx of 3 prior cesareans.  Anterior placenta previa. Fetal - FGR, cardiomegaly, pericardial effusion, small stomach bubble, once kidney is multicystic and enlarged, contralateral kidney not visualized.  Anhydramnios. NIPS positive for Trisomy 15. -Bri Krishnamurthy, BRENDAC  Last MFM US from  reveals:   EFW at 11th percentile with AC at 5th%, 890gm  Cardiomegaly and pericardial effusion observed  Small stomach bubble  One kidney multicystic and enlarged, contralateral kidney not visualized  Anhydramnios  Single umbilical artery  BPP 6/8, -2 for fluid, anterior previa  Doppler WNL  No sonographic evidence of previa or invasive placentation.   Had Peds Nephrology consult 7/3  For MRI on     H/O Asthma- last used rescue inhaler 1 month ago. No hospitalizations or intubations.  C/S X 3

## 2025-07-07 NOTE — OB PROVIDER TRIAGE NOTE - NSHPLABSRESULTS_GEN_ALL_CORE
Urinalysis Basic - ( 2025 10:59 )    Color: Red / Appearance: Turbid / S.009 / pH: x  Gluc: x / Ketone: x  / Bili: Small / Urobili: 0.2 mg/dL   Blood: x / Protein: 300 mg/dL / Nitrite: Positive   Leuk Esterase: Moderate / RBC: tntc /HPF / WBC 10-20 /HPF   Sq Epi: x / Non Sq Epi: x / Bacteria: Moderate /HPF

## 2025-07-07 NOTE — OB PROVIDER TRIAGE NOTE - CURRENT PREGNANCY COMPLICATIONS, OB PROFILE
Anhydramnios/Fetal alert/Fetal Anomalies/Other Anhydramnios/Fetal alert/Abnormal Amniotic Fluid Volume/Abnormal Fetal Surveillance/Fetal Anomalies/Gestational Age less than 36 Weeks/Intrauterine Growth Restriction/Maternal Unknown GBS/Vaginal Bleeding/Other/Asthma

## 2025-07-07 NOTE — OB RN PATIENT PROFILE - WEIGHT PRE-PREGNANCY IN KG
normal appearance , without tenderness upon palpation , no deformities , trachea midline , Thyroid normal size , no thyroid nodules , no masses , no JVD , thyroid nontender
95

## 2025-07-07 NOTE — OB RN TRIAGE NOTE - FALL HARM RISK - UNIVERSAL INTERVENTIONS
Bed in lowest position, wheels locked, appropriate side rails in place/Call bell, personal items and telephone in reach/Instruct patient to call for assistance before getting out of bed or chair/Non-slip footwear when patient is out of bed/[place] to call system/Physically safe environment - no spills, clutter or unnecessary equipment/Purposeful Proactive Rounding/Room/bathroom lighting operational, light cord in reach

## 2025-07-07 NOTE — OB RN TRIAGE NOTE - NSMATERNALFETALCONCERNS_OBGYN_ALL_OB_FT
Maternal/Fetal Alert  7/2/25 - Maternal - Hx of 3 prior cesareans.  Anterior placenta previa. Fetal - FGR, cardiomegaly, pericardial effusion, small stomach bubble, once kidney is multicystic and enlarged, contralateral kidney not visualized.  Anyhydramnios. NIPS positive for Trisomy 15. -Bri Krishnamurthy, RNC

## 2025-07-07 NOTE — OB PROVIDER H&P - CURRENT PREGNANCY COMPLICATIONS, OB PROFILE
Anhydramnios/Fetal alert/Abnormal Amniotic Fluid Volume/Abnormal Fetal Surveillance/Fetal Anomalies/Gestational Age less than 36 Weeks/Intrauterine Growth Restriction/Maternal Unknown GBS/Vaginal Bleeding/Other/Asthma

## 2025-07-07 NOTE — CONSULT NOTE ADULT - SUBJECTIVE AND OBJECTIVE BOX
MATERNAL FETAL MEDICINE CONSULT NOTE     CC:     HPI:  She denies contractions, leakage of fluid, vaginal bleeding, decreased fetal movement.  Denies headache, changes in vision, chest pain, shortness of breath, RUQ pain.     HISTORIES:  OB:   Gyn:  PMH:   PSH:   ALL:   Meds:   FHx:     Vital Signs Last 24 Hours  T(C): 36.9 (07-07-25 @ 15:12), Max: 36.9 (07-07-25 @ 10:38)  HR: 83 (07-07-25 @ 15:12) (82 - 113)  BP: 105/65 (07-07-25 @ 15:12) (100/67 - 127/73)  RR: 17 (07-07-25 @ 15:12) (16 - 17)  SpO2: 97% (07-07-25 @ 15:10) (91% - 100%)    Physical Exam:  General: NAD  Abdomen: Soft, non-tender, gravid  Ext: No edema or tenderness      Labs:             10.1   9.02  )-----------( 325      ( 07-07 @ 12:10 )             30.8           PT/INR - ( 07-07 @ 14:15 )   PT: 11.0 sec;   INR: 0.92 ratio    PTT - ( 07-07 @ 14:15 )  PTT:25.6 sec        MEDICATIONS  (STANDING):  cefTRIAXone   IVPB        MEDICATIONS  (PRN):  albuterol    90 MICROgram(s) HFA Inhaler 2 Puff(s) Inhalation every 6 hours PRN Shortness of Breath and/or Wheezing   MATERNAL FETAL MEDICINE CONSULT NOTE     CC: 36 yo  (prior CD X 3, AB X 3 ) at 27w6d with anterior placenta previa with Trisomy 15 on NIPS with fetal cardiomegaly, pericardial effusion, multicystic kidneys and anhydramnios seen in triage today with complaints of vaginal bleeding started this morning. Denies abdominal contractions, leakage of fluid or decreased fetal movements      She was referred from [facility] and seen first in the clinic on 25. Counselled regarding fetal status but patient declined termination of pregnancy  and wanted all the fetal interventions at that time.     HISTORIES:  OB:  (Prior CD X 3, ABX  3)  Gyn: none   PMH: none   PSH: CD X 3, Umbilical hernia repair   ALL: none  Meds: ASA 81 mg, PNV   FHx: None     Vital Signs Last 24 Hours  T(C): 36.9 (25 @ 15:12), Max: 36.9 (25 @ 10:38)  HR: 83 (25 @ 15:12) (82 - 113)  BP: 105/65 (25 @ 15:12) (100/67 - 127/73)  RR: 17 (25 @ 15:12) (16 - 17)  SpO2: 97% (25 @ 15:10) (91% - 100%)    Physical Exam:  General: NAD  Abdomen: Soft, non-tender, gravid  Ext: No edema or tenderness      Labs:             10.1   9.02  )-----------( 325      (  @ 12:10 )             30.8           PT/INR - (  @ 14:15 )   PT: 11.0 sec;   INR: 0.92 ratio    PTT - (  @ 14:15 )  PTT:25.6 sec        MEDICATIONS  (STANDING):  cefTRIAXone   IVPB        MEDICATIONS  (PRN):  albuterol    90 MICROgram(s) HFA Inhaler 2 Puff(s) Inhalation every 6 hours PRN Shortness of Breath and/or Wheezing   MATERNAL FETAL MEDICINE CONSULT NOTE     CC: 36 yo  (prior CD X 3, SAB X 3 ) at 27w6d with anterior placenta previa with Trisomy 15 on NIPS with fetal cardiomegaly, pericardial effusion, multicystic kidneys and anhydramnios seen in triage today with complaints of vaginal bleeding started this morning. Denies abdominal contractions, leakage of fluid or decreased fetal movements      She was referred from [facility] and seen first in our clinic on 25. US revealed EFW 11 %tile, AC 5% tile, 890 gm with cardiomegaly, pericardial effusion small stomach bubble , one kidney multicystic and enlarged , contralateral kidney not visualized anhydramnios, single umbilical artery, anterior previa, dopplers wnl     HISTORIES:  OB:  (Prior CD X 3; - failure to progress, 2012- FT repeat, 2015- Ft repeat, SAB X  3)  Gyn: none   PMH: Asthma  PSH: CD X 3, Umbilical hernia repair   ALL: none  Meds: ASA 81 mg, PNV   FHx: None     Vital Signs Last 24 Hours  T(C): 36.9 (25 @ 15:12), Max: 36.9 (25 @ 10:38)  HR: 83 (25 @ 15:12) (82 - 113)  BP: 105/65 (25 @ 15:12) (100/67 - 127/73)  RR: 17 (25 @ 15:12) (16 - 17)  SpO2: 97% (25 @ 15:10) (91% - 100%)    Physical Exam:  General: NAD  Abdomen: Soft, non-tender, gravid  Ext: No edema or tenderness      Labs:             10.1   9.02  )-----------( 325      (  @ 12:10 )             30.8           PT/INR - (  @ 14:15 )   PT: 11.0 sec;   INR: 0.92 ratio    PTT - (  @ 14:15 )  PTT:25.6 sec      SSE done in triage ; 10 cc blood in vaginal vault with no active bleeding   US today done in triage ; , Breech, anterior previa, anhydramnios     MEDICATIONS  (STANDING):  cefTRIAXone   IVPB        MEDICATIONS  (PRN):  albuterol    90 MICROgram(s) HFA Inhaler 2 Puff(s) Inhalation every 6 hours PRN Shortness of Breath and/or Wheezing

## 2025-07-07 NOTE — OB PROVIDER TRIAGE NOTE - NS_OBGYNHISTORY_OBGYN_ALL_OB_FT
sab x3  8/2/11 primary csection Failure to progress 7+  7/20/12 repeat 8+  4/16/15 repeat 7+ sab x3  8/2/11 primary csection Failure to progress 7+  7/20/12 FT repeat 8+  4/16/15 FT repeat 7+    AP course complicated by fetal alert:   7/2/25 - Maternal - Hx of 3 prior cesareans.  Anterior placenta previa. Fetal - FGR, cardiomegaly, pericardial effusion, small stomach bubble, once kidney is multicystic and enlarged, contralateral kidney not visualized.  Anhydramnios. NIPS positive for Trisomy 15. -Bri Krishnamurthy, RNC  Last M US from 7/1 reveals:   EFW at 11th percentile with AC at 5th%, 890gm  Cardiomegaly and pericardial effusion observed  Small stomach bubble  One kidney multicystic and enlarged, contralateral kidney not visualized  Anhydramnios  Single umbilical artery  BPP 6/8, -2 for fluid, anterior previa  Doppler WNL  No sonographic evidence of previa or invasive placentation.   Had Peds Nephrology consult 7/3  For MRI on 7/11  GBS unknown

## 2025-07-07 NOTE — OB RN TRIAGE NOTE - NS_TRIAGETIMEOFMEDICALSCREENING_OBGYN_ALL_OB_DT
Acute pancreatitis  1990's  Anxiety    Hypertension, unspecified type    Ocular migraine  last 5/22/18  Spinal stenosis of lumbar region 07-Jul-2025 11:09

## 2025-07-07 NOTE — OB PROVIDER TRIAGE NOTE - HISTORY OF PRESENT ILLNESS
36yo  @ 27.6 presents with c/o vaginal bleeding since this morning when wiping. Denies pain, recent intercourse, dysuria. Patient presented today for routine prenatal care in [facility] and told them her complaint and was sent to triage without being seen.   Patient is transfer of care from [facility] for multiple fetal concerns.   Had first clinic appointment on   AP course complicated by fetal alert:   25 - Maternal - Hx of 3 prior cesareans.  Anterior placenta previa. Fetal - FGR, cardiomegaly, pericardial effusion, small stomach bubble, once kidney is multicystic and enlarged, contralateral kidney not visualized.  Anhydramnios. NIPS positive for Trisomy 15. -Bri Krishnamurthy, BRENDAC  Last MFM US from  reveals:   EFW at 11th percentile with AC at 5th%, 890gm  Cardiomegaly and pericardial effusion observed  Small stomach bubble  One kidney multicystic and enlarged, contralateral kidney not visualized  Anhydramnios  Single umbilical artery  BPP 6/8, -2 for fluid, anterior previa  Doppler WNL  No sonographic evidence of previa or invasive placentation.   Had Peds Nephrology consult 7/3  For MRI on  34yo  @ 27.6 presents with c/o vaginal bleeding since this morning when wiping. Denies pain, recent intercourse, dysuria. Patient presented today for routine prenatal care in [facility] and told them her complaint and was sent to triage without being seen.   Patient is transfer of care from [facility] for multiple fetal concerns.   Had first clinic appointment on   AP course complicated by fetal alert:   25 - Maternal - Hx of 3 prior cesareans.  Anterior placenta previa. Fetal - FGR, cardiomegaly, pericardial effusion, small stomach bubble, once kidney is multicystic and enlarged, contralateral kidney not visualized.  Anhydramnios. NIPS positive for Trisomy 15. -Bri Krishnamurthy, BRENDAC  Last MFM US from  reveals:   EFW at 11th percentile with AC at 5th%, 890gm  Cardiomegaly and pericardial effusion observed  Small stomach bubble  One kidney multicystic and enlarged, contralateral kidney not visualized  Anhydramnios  Single umbilical artery  BPP 6/8, -2 for fluid, anterior previa  Doppler WNL  No sonographic evidence of previa or invasive placentation.   Had Peds Nephrology consult 7/3  For MRI on     H/O Asthma- last used rescue inhaler 1 month ago  C/S X 3

## 2025-07-07 NOTE — OB PROVIDER H&P - ASSESSMENT
34yo  @ 27.6 admission for vaginal bleeding with placenta previa  Routine admission orders       CBC       Type and screen       RPR  Coags and Fibrinogen  GC/Chlamydia/Trich culture  GBS culture  Urine culture  Ceftriaxone 1000mg q24 hours  Regular Diet  FH check Q 12 hours  Pad count  Cross match x 2 units  D/W Dr. Butler      Risks, benefits, alternatives, and possible complications have been discussed in detail with the patient in her native language. Pre-admission, admission, and post admission procedures and expectations were discussed in detail. All questions answered, all appropriate hospital consents were signed.     Informed consent was obtained. The following was discussed:  - Obstetrical management including internal fetal/contraction monitoring  - Possible  section

## 2025-07-07 NOTE — OB PROVIDER TRIAGE NOTE - NSHPPHYSICALEXAM_GEN_ALL_CORE
Assessment reveals VSS, abdomen soft, NT, gravid.   /73 pulse 90  No CVA tenderness  Upon initial assessment- Last ATU US reviewed by Dr. Townsend, no external fetal monitoring at this time.     Urine appears hematuric   SSE- difficult to visualize cervix due to patient discomfort, 10cc of dark red blood in vault. No active bleeding      Performed by Dr. Mann  TVS- cerival length 4.9 no funneling or dynamic changes  UA and coags sent    MFM US performed- Breech, anterior previa, anhydramnios   For MFM consult.     1225: Dr. Shi in to evaluate patient.  For admission for observation.

## 2025-07-07 NOTE — OB RN PATIENT PROFILE - NS_CIRCUMCISIONPLAN_OBGYN_ALL_OB
Pulmonary / Critical Care Progress Note      Patient Name: Mirna Meredith  : 1945  MRN: [id number]  Attending:  Cesar Sweet MD   Date of admission: 2021    Subjective   Subjective   Patient critically ill with septic shock, status post ureteral stenting    Over past 24 hours:  Was started on pressors overnight.  Also given gentle saline bolus.  Is on a small amount of norepinephrine.  Cultures are pending.  She remains weak and fatigued.  Heart rate is also elevated.  Her abdominal pain is better and urine output is improved.  She is tolerating her diet this morning  No dyspnea or cough  No chest pain or hemoptysis  Magnesium low this morning as well.  Creatinine stable      Review of Systems  Constitutional symptoms:   Fatigue and weakness, otherwise denied complaints   Ear, nose, throat: Denied complaints  Cardiovascular:  Denied complaints  Respiratory: Denied complaints  Gastrointestinal: Abdominal pain, otherwise denied complaints  Musculoskeletal: Denied complaints  Neurologic: Denied complaints  Skin: Denied complaints        Objective   Objective     Vitals:   Vital signs for last 24 hours:  Temp:  [97.3 °F (36.3 °C)-103.2 °F (39.6 °C)] 99.4 °F (37.4 °C)  Heart Rate:  [] 122  Resp:  [15-30] 16  BP: ()/() 156/68    Intake/Output last 3 shifts:  I/O last 3 completed shifts:  In: 4365.2 [I.V.:3365.2; IV Piggyback:1000]  Out: 675 [Urine:675]  Intake/Output this shift:  I/O this shift:  In: 100 [IV Piggyback:100]  Out: -     Vent settings for last 24 hours:       Hemodynamic parameters for last 24 hours:       Physical Exam   Vital Signs Reviewed   Elderly female, Alert, NAD.    HEENT:  PERRL, EOMI.  OP, nares clear  Chest:  good aeration, clear to auscultation bilaterally, tympanic to percussion bilaterally, no work of breathing noted  CV: Sinus tachycardia, no MGR, pulses 2+, equal.  Abd:  Soft, mild abdominal tenderness, ND, + BS, no HSM, obese  EXT:  no clubbing, no  cyanosis, trace BLE edema  Neuro:  A&Ox3, CN grossly intact, no focal deficits.  Skin: No rashes or lesions noted        Result Review    Result Review:  I have personally reviewed the results from the time of this admission to 11/15/2021 10:37 EST and agree with these findings:  [x]  Laboratory  [x]  Microbiology  []  Radiology  [x]  EKG/Telemetry   []  Cardiology/Vascular   []  Pathology  []  Old records  []  Other:  Most notable findings include: Operative note from yesterday personally reviewed.  Sinus tachycardia noted on monitor.  Does have acute kidney injury now.  Magnesium low this morning.  BNP 32,000.  Glucose elevated.  Sodium low.    Assessment/Plan   Assessment / Plan     Active Hospital Problems:  Active Hospital Problems    Diagnosis    • Metabolic encephalopathy          Impression:  Septic shock  Right hydronephrosis secondary to obstruction related to metastasis of breast cancer, s/p ureteral stent placement   Urinary tract infection/right pyelonephritis from unspecified organism  JOEPSH secondary to prerenal etiology versus acute tubular necrosis  Hypomagnesemia  Ascites, likely malignant  New pleural-based metastatic disease  Weakness with functional paraplegia  COPD  Type 2 diabetes with hyperglycemia  HTN  History of breast cancer with mets to colon, liver lung and bone  History of C. difficile  Chronic low back pain  Pancytopenia  Hard of hearing     Plan:  Continue supplemental O2.  Titrate to maintain SPO2 greater than 90%  Continue norepinephrine.  Wean to keep mean arterial pressure greater than 65  Bolus another 2 L of lactated Ringer's.  Continue LR at current rate  Continue ceftriaxone, day 2.  De-escalate based off cultures.  Currently pending/negative procalcitonin greater than 2 yesterday  Echocardiogram pending.  BNP 32,000.  Unfortunately, patient needs to be volume resuscitated and septic shock treat prior to addressing any volume overload  Lactate WNL.  Pro-Ayo elevated 2.18  Renal  function worse.  Will monitor closely.  Replace magnesium IV  Urology on board.  Appreciate assistance  Hematology/Oncology on board.  Appreciate their assistance.  Holding oral chemotherapy for now  Continue sliding scale insulin  Diet as tolerated     DVT prophylaxis:  Mechanical DVT prophylaxis orders are present.    CODE STATUS:   Code Status (Patient has no pulse and is not breathing): No CPR (Do Not Attempt to Resuscitate)  Medical Interventions (Patient has pulse or is breathing): Full Support      The patient is critically ill in the ICU with septic shock, right hydronephrosis with obstruction secondary to metastatic breast cancer, acute kidney injury, hypomagnesemia, UTI versus pyelonephritis from unspecified organism. Multidisciplinary bedside critical care rounds were performed with nursing staff, respiratory therapy, pharmacy, nutritional services, social work. I have personally reviewed the chart, labs and any pertinent imaging available.  I have spent 32 minutes of critical care time, excluding procedures, in the care of this patient.    Electronically signed by Juan Miguel Mcmullen MD, 11/15/21, 10:40 AM EST.                N/A

## 2025-07-08 ENCOUNTER — APPOINTMENT (OUTPATIENT)
Dept: PEDIATRIC CARDIOLOGY | Facility: CLINIC | Age: 35
End: 2025-07-08

## 2025-07-08 ENCOUNTER — ASOB RESULT (OUTPATIENT)
Age: 35
End: 2025-07-08

## 2025-07-08 ENCOUNTER — RESULT REVIEW (OUTPATIENT)
Age: 35
End: 2025-07-08

## 2025-07-08 ENCOUNTER — APPOINTMENT (OUTPATIENT)
Dept: ANTEPARTUM | Facility: CLINIC | Age: 35
End: 2025-07-08
Payer: MEDICAID

## 2025-07-08 PROBLEM — Z78.9 OTHER SPECIFIED HEALTH STATUS: Chronic | Status: INACTIVE | Noted: 2021-01-12 | Resolved: 2025-07-07

## 2025-07-08 PROBLEM — J45.909 UNSPECIFIED ASTHMA, UNCOMPLICATED: Chronic | Status: ACTIVE | Noted: 2025-07-07

## 2025-07-08 LAB
C TRACH RRNA SPEC QL NAA+PROBE: SIGNIFICANT CHANGE UP
CULTURE RESULTS: SIGNIFICANT CHANGE UP
FLUAV AG NPH QL: SIGNIFICANT CHANGE UP
FLUBV AG NPH QL: SIGNIFICANT CHANGE UP
HCT VFR BLD CALC: 30.2 % — LOW (ref 34.5–45)
HGB BLD-MCNC: 9.8 G/DL — LOW (ref 11.5–15.5)
MCHC RBC-ENTMCNC: 28.2 PG — SIGNIFICANT CHANGE UP (ref 27–34)
MCHC RBC-ENTMCNC: 32.5 G/DL — SIGNIFICANT CHANGE UP (ref 32–36)
MCV RBC AUTO: 87 FL — SIGNIFICANT CHANGE UP (ref 80–100)
N GONORRHOEA RRNA SPEC QL NAA+PROBE: SIGNIFICANT CHANGE UP
NRBC # BLD AUTO: 0 K/UL — SIGNIFICANT CHANGE UP (ref 0–0)
NRBC # FLD: 0 K/UL — SIGNIFICANT CHANGE UP (ref 0–0)
NRBC BLD AUTO-RTO: 0 /100 WBCS — SIGNIFICANT CHANGE UP (ref 0–0)
PLATELET # BLD AUTO: 321 K/UL — SIGNIFICANT CHANGE UP (ref 150–400)
PMV BLD: 8.8 FL — SIGNIFICANT CHANGE UP (ref 7–13)
RBC # BLD: 3.47 M/UL — LOW (ref 3.8–5.2)
RBC # FLD: 15.2 % — HIGH (ref 10.3–14.5)
RSV RNA NPH QL NAA+NON-PROBE: SIGNIFICANT CHANGE UP
SARS-COV-2 RNA SPEC QL NAA+PROBE: SIGNIFICANT CHANGE UP
SOURCE RESPIRATORY: SIGNIFICANT CHANGE UP
SPECIMEN SOURCE: SIGNIFICANT CHANGE UP
SPECIMEN SOURCE: SIGNIFICANT CHANGE UP
T PALLIDUM AB TITR SER: NEGATIVE — SIGNIFICANT CHANGE UP
T VAGINALIS RRNA SPEC QL NAA+PROBE: DETECTED
WBC # BLD: 7.27 K/UL — SIGNIFICANT CHANGE UP (ref 3.8–10.5)
WBC # FLD AUTO: 7.27 K/UL — SIGNIFICANT CHANGE UP (ref 3.8–10.5)

## 2025-07-08 PROCEDURE — 76820 UMBILICAL ARTERY ECHO: CPT | Mod: 26

## 2025-07-08 PROCEDURE — 76817 TRANSVAGINAL US OBSTETRIC: CPT | Mod: 26

## 2025-07-08 PROCEDURE — 76827 ECHO EXAM OF FETAL HEART: CPT | Mod: 26

## 2025-07-08 PROCEDURE — 93325 DOPPLER ECHO COLOR FLOW MAPG: CPT | Mod: 26,59

## 2025-07-08 PROCEDURE — 76819 FETAL BIOPHYS PROFIL W/O NST: CPT | Mod: 26

## 2025-07-08 PROCEDURE — 72195 MRI PELVIS W/O DYE: CPT | Mod: 26

## 2025-07-08 PROCEDURE — 76825 ECHO EXAM OF FETAL HEART: CPT | Mod: 26

## 2025-07-08 PROCEDURE — 76821 MIDDLE CEREBRAL ARTERY ECHO: CPT | Mod: 26

## 2025-07-08 RX ORDER — METRONIDAZOLE 250 MG
500 TABLET ORAL EVERY 12 HOURS
Refills: 0 | Status: DISCONTINUED | OUTPATIENT
Start: 2025-07-08 | End: 2025-07-10

## 2025-07-08 RX ORDER — SENNA 187 MG
2 TABLET ORAL DAILY
Refills: 0 | Status: DISCONTINUED | OUTPATIENT
Start: 2025-07-08 | End: 2025-07-10

## 2025-07-08 RX ORDER — POLYETHYLENE GLYCOL 3350 17 G/17G
17 POWDER, FOR SOLUTION ORAL
Refills: 0 | Status: DISCONTINUED | OUTPATIENT
Start: 2025-07-08 | End: 2025-07-10

## 2025-07-08 RX ADMIN — Medication 500 MILLIGRAM(S): at 20:32

## 2025-07-08 RX ADMIN — POLYETHYLENE GLYCOL 3350 17 GRAM(S): 17 POWDER, FOR SOLUTION ORAL at 18:49

## 2025-07-08 RX ADMIN — CEFTRIAXONE 100 MILLIGRAM(S): 500 INJECTION, POWDER, FOR SOLUTION INTRAMUSCULAR; INTRAVENOUS at 15:20

## 2025-07-08 NOTE — PROGRESS NOTE ADULT - NSPROGADDITIONALINFOA_GEN_ALL_CORE
=====M FELLOW ADDENDUM=====    36yo  @ 28w admitted for vaginal bleeding with placenta previa on . =====MFM FELLOW ADDENDUM=====    36 yo  28w with anterior placenta previa with Trisomy 15 on NIPS with fetal cardiomegaly, pericardial effusion, multicystic kidneys and anhydramnios admitted for vaginal bleeding. VS stable, asymptomatic and vaginal bleeding significantly decreased now.  Patient counselled again today regarding extremely poor fetal prognosis with positive Trisomy 15 on NIPS with fetal cardiomegaly, pericardial effusion, multicystic kidneys and anhydramnios at this gestational age . Comfort care discussed again with the patient.     Recommendations:  -Pelvic MRI abdomen/pelvis to rule out PAS  -F/U Fetal ECHO  -NICU consult   -No fetal monitoring till work up for PAS is completed  -Regular diet  - c/w ceftriaxone (-)  - f/u UCx ()    Patient seen and counselled with Dr. Shi- M Attending     Danielle Shukla-PGY 5 -MFM Fellow =====MFM FELLOW ADDENDUM=====    36 yo  @ 28w with anterior placenta previa with Trisomy 15 on NIPS with fetal cardiomegaly, pericardial effusion, multicystic kidneys and anhydramnios admitted for vaginal bleeding. VS stable, asymptomatic and vaginal bleeding significantly decreased now.  Patient counselled again today regarding extremely poor fetal prognosis with positive Trisomy 15 on NIPS with fetal cardiomegaly, pericardial effusion, multicystic kidneys and anhydramnios at this gestational age . Comfort care discussed again with the patient.     Recommendations:  -Pelvic MRI abdomen/pelvis to rule out PAS  -F/U Fetal ECHO  -NICU consult   -No fetal monitoring till work up for PAS is completed  -Regular diet  -c/w ceftriaxone (-) for possible UTI  - f/u UCx ()    Patient seen and counselled with Dr. Shi- DAKSHAM Attending     Danielle Shukla-PGY 5 -MFM Fellow

## 2025-07-08 NOTE — CONSULT NOTE PEDS - SUBJECTIVE AND OBJECTIVE BOX
Ms. Novak is a 34yo  who is currently admitted at 28.0 weeks with multiple fetal anomalies including fetal cardiomegaly, a pericardial effusion, small stomach bubble, single umbilical artery, a single multicystic kidney with a contralateral kidney that is missing or atretic. The pregnancy is further complicated by positive NIPS for Trisomy 15 and anhydramnios diagnosed at approximately 20 weeks gestation. Ms. Novak is currently admitted with concerns of vaginal bleeding in the setting of placenta previa.     Prior to this meeting, Ms. Sibley had met with Pediatric Nephrology team. During this conversation Ms. Sibley was made aware that the single multicystic kidney is dysplastic and has minimal functioning tissue and the contralateral kidney is either very small or absent. In the setting of the anhydramions the kidney function is likely to be low to none. Given the significance of this renal dysfunction, the infant would require renal replacement therapy as a bridge to a later renal transplant (which would occur after 1 year of life).     Much of our discussion focused on the impact of anhydramnios on the fetus' ability to develop lung tissue. Given the timing of the anhydramnios (20 weeks per OB note), there is concern for significant pulmonary hypoplasia. Ms. Sibley was made aware that in the setting of severe pulmonary hypoplasia, the initial resuscitation efforts for the infant would require intubation and mechanical ventilation which ultimately may not be sufficient be able to overcome the pulmonary insufficiency leading to death.     We discussed the implications of Trisomy 15 and Trisomy 15 mosiasim. Ms. Sibley was aware that most pregnancies of T15 spontaneously miscarry. She remains hopeful that the since she had made it to 28 weeks, the the NIPS testing is inaccurate. We discussed there are cases of Trisomy 15 live births however, those infants have significant medical complications and often die.     I discussed with Ms. Sibley that given the degree of pulmonary hypoplasia in the setting of low to absent fetal renal function, Trisomy 15 and the high chance of fatality after live birth, the decision to not provide resuscitation to the infant is an option. In this case, the infant would remain with mother and could be made comfortable with pain medication as needed - acknowledging that this will lead to eventual death of the infant.     We also discussed that she could choose to pursue full resuscitation efforts, which given then severity of the pulmonary hypoplasia could also ultimately result in a demise.     Ms. Novak and her partner had the opportunity to ask questions. At this time, the parents have not confirmed their plan for resuscitation. I informed that the NICU team is available to discuss details further and encouraged them to reach out to us with further questions.     Ms. Novak and her partner had no further questions.     Case discussed with NICU attending, Hannah Shook   NICU fellow, PGY 6

## 2025-07-08 NOTE — CHART NOTE - NSCHARTNOTEFT_GEN_A_CORE
Pt is a 36yo  @ 28w admission for vaginal bleeding with placenta previa on . This morning pt report dry cough that started yesterday. Pt denies chest pain, shortness of breath, wheezing, hemoptysis, dyspnea, palpitation, fevers, chills, nausea or Vomitting.   vital signs stable, afebrile  lung clear bilaterally.   RVP panel sent    CARMENCITA Isaac

## 2025-07-08 NOTE — PROGRESS NOTE ADULT - SUBJECTIVE AND OBJECTIVE BOX
PGY 3 Antepartum Note    Patient seen and examined at bedside in NAD. Denies any CTX, LOF or VB.  Reports good fetal movement.    T(F): 98.6 (05:56)  HR: 82 (05:56)  BP: 102/55 (05:56)  RR: 18 (05:56)        Gen: NAD  Cardio:  clinically well perfused  Pulm: saturating well on RA  Abd: gravid, nontender, no palpable contractions  Ext: no edema,  no calf enderness  SVE: deferred    Medications:    cefTRIAXone   IVPB: 100 mL/Hr (25 @ 16:16)      Labs:                        9.8    7.27  )-----------( 321      ( 2025 06:00 )             30.2             Urinalysis Basic - ( 2025 10:59 )    Color: Red / Appearance: Turbid / S.009 / pH: x  Gluc: x / Ketone: x  / Bili: Small / Urobili: 0.2 mg/dL   Blood: x / Protein: 300 mg/dL / Nitrite: Positive   Leuk Esterase: Moderate / RBC: tntc /HPF / WBC 10-20 /HPF   Sq Epi: x / Non Sq Epi: x / Bacteria: Moderate /HPF             PGY 3 Antepartum Note    Patient seen and examined at bedside in NAD. Denies any CTX, LOF or VB.  Reports good fetal movement.    T(F): 98.6 (05:56)  HR: 82 (05:56)  BP: 102/55 (05:56)  RR: 18 (05:56)        Gen: NAD  Cardio:  clinically well perfused  Pulm: saturating well on RA  Abd: gravid, nontender, no palpable contractions  Ext: no edema,  no calf tenderness  SVE: deferred    Medications:    cefTRIAXone   IVPB: 100 mL/Hr (25 @ 16:16)      Labs:                        9.8    7.27  )-----------( 321      ( 2025 06:00 )             30.2             Urinalysis Basic - ( 2025 10:59 )    Color: Red / Appearance: Turbid / S.009 / pH: x  Gluc: x / Ketone: x  / Bili: Small / Urobili: 0.2 mg/dL   Blood: x / Protein: 300 mg/dL / Nitrite: Positive   Leuk Esterase: Moderate / RBC: tntc /HPF / WBC 10-20 /HPF   Sq Epi: x / Non Sq Epi: x / Bacteria: Moderate /HPF

## 2025-07-09 ENCOUNTER — APPOINTMENT (OUTPATIENT)
Dept: ANTEPARTUM | Facility: CLINIC | Age: 35
End: 2025-07-09

## 2025-07-09 PROCEDURE — 76816 OB US FOLLOW-UP PER FETUS: CPT | Mod: 26

## 2025-07-09 PROCEDURE — 76819 FETAL BIOPHYS PROFIL W/O NST: CPT | Mod: 26,59

## 2025-07-09 RX ORDER — BENZONATATE 100 MG
100 CAPSULE ORAL THREE TIMES A DAY
Refills: 0 | Status: DISCONTINUED | OUTPATIENT
Start: 2025-07-09 | End: 2025-07-10

## 2025-07-09 RX ADMIN — Medication 1 LOZENGE: at 21:41

## 2025-07-09 RX ADMIN — CEFTRIAXONE 100 MILLIGRAM(S): 500 INJECTION, POWDER, FOR SOLUTION INTRAMUSCULAR; INTRAVENOUS at 14:53

## 2025-07-09 RX ADMIN — Medication 500 MILLIGRAM(S): at 09:46

## 2025-07-09 RX ADMIN — Medication 100 MILLIGRAM(S): at 18:42

## 2025-07-09 RX ADMIN — Medication 500 MILLIGRAM(S): at 21:41

## 2025-07-09 NOTE — PROGRESS NOTE ADULT - SUBJECTIVE AND OBJECTIVE BOX
Patient is a 35y old  Female who presents with a chief complaint of fetal anomalies    HPI:  34yo  @ 27.6 presents with c/o vaginal bleeding since this morning when wiping. Denies pain, recent intercourse, dysuria. Patient presented today for routine prenatal care in [facility] and told them her complaint and was sent to triage without being seen.   Patient is transfer of care from [facility] for multiple fetal concerns.   Had first clinic appointment on   AP course complicated by fetal alert:   25 - Maternal - Hx of 3 prior cesareans.  Anterior placenta previa. Fetal - FGR, cardiomegaly, pericardial effusion, small stomach bubble, once kidney is multicystic and enlarged, contralateral kidney not visualized.  Anhydramnios. NIPS positive for Trisomy 15. -Bri Krishnamurthy, [facility]END[facility]  Last MFM US from  reveals:   EFW at 11th percentile with AC at 5th%, 890gm  Cardiomegaly and pericardial effusion observed  Small stomach bubble  One kidney multicystic and enlarged, contralateral kidney not visualized  Anhydramnios  Single umbilical artery  BPP , -2 for fluid, anterior previa  Doppler WNL  No sonographic evidence of previa or invasive placentation.   Had Peds Nephrology consult 7/3  For MRI on     H/O Asthma- last used rescue inhaler 1 month ago. No hospitalizations or intubations.  C/S X 3 (2025 13:25)      PAST MEDICAL & SURGICAL HISTORY:  Asthma      History of   times three          MEDICATIONS  (STANDING):  cefTRIAXone   IVPB 1000 milliGRAM(s) IV Intermittent every 24 hours  cefTRIAXone   IVPB      metroNIDAZOLE    Tablet 500 milliGRAM(s) Oral every 12 hours              Vital Signs Last 24 Hrs  T(C): 37 (2025 10:32), Max: 37.2 (2025 21:59)  T(F): 98.6 (2025 10:32), Max: 98.9 (2025 21:59)  HR: 90 (2025 10:32) (80 - 91)  BP: 116/64 (2025 10:32) (96/56 - 123/74)  BP(mean): --  RR: 17 (2025 10:32) (15 - 18)  SpO2: 98% (2025 10:32) (96% - 100%)    Parameters below as of 2025 10:32  Patient On (Oxygen Delivery Method): room air        PHYSICAL EXAM:  Patient was seen at bedside and bedside scan was done again.  The fetus is in breech presentation with anterior placenta from the cervix to the fundus of the uterus.  There is no amniotic fluid visualized in any quadrant. No bladder or stomach bubbles were noted and there is a large multicystic dysplastic kidney noted. The chest circumference was difficult to obtain and was at 25th percentile and LHR was calculated to 0.5 but the visualization was poor due to anhydramnios. fetal movements were noted. Fetal weight was estimated to be 2 lb 5 oz at 14th percentile.  We have had extensive discussions about these findings and management options were discussed.  The patient has had 3 prior  sections and with and anterior previa will need  delivery. I was not able to locate any placenta free window anteriorly and although MRI study does not suggest a placenta accreta, there is potential for heavy bleeding.  Our discussion with the patient included fetal monitoring , emergency surgery and planned  delivery.  We discussed potential for severe pulmonary hypoplasia and absent renal function. The implications of possible trisomy 15 were also discussed.  We discussed fetal monitoring and if monitor showed fetal decelerations she would need emergency  delivery. In view of the gestational age this may worsen fetal outlook due to prematurity.  We discussed out patient intermittent evaluation in ATU. It is possible that there may be intrauterine fetal demise.  Finally we also discussed planned early delivery.  Patient has not made any decision yet and will discuss with us after she has more time to consider her options           I&O's Summary      LABORATORY:                        9.8    7.27  )-----------( 321      ( 2025 06:00 )             30.2           PT/INR - ( 2025 14:15 )   PT: 11.0 sec;   INR: 0.92 ratio         PTT - ( 2025 14:15 )  PTT:25.6 sec

## 2025-07-09 NOTE — PROGRESS NOTE ADULT - SUBJECTIVE AND OBJECTIVE BOX
PGY 3 Antepartum Note    Patient seen and examined at bedside in NAD. Denies any CTX, LOF or VB.  Reports good fetal movement.    T(F): 98.9 (05:37)  HR: 91 (05:37)  BP: 123/74 (05:37)  RR: 18 (05:37)      Gen: NAD  Cardio:  clinically well perfused  Pulm: saturating well on RA  Abd: gravid, nontender, no palpable contractions  Ext: no edema,  no calf enderness  SVE: deferred    Medications:  cefTRIAXone   IVPB: 100 mL/Hr (25 @ 15:20)  cefTRIAXone   IVPB: 100 mL/Hr (25 @ 16:16)  metroNIDAZOLE    Tablet: 500 milliGRAM(s) (25 @ 20:32)      Labs:                        9.8    7.27  )-----------( 321      ( 2025 06:00 )             30.2             Urinalysis Basic - ( 2025 10:59 )    Color: Red / Appearance: Turbid / S.009 / pH: x  Gluc: x / Ketone: x  / Bili: Small / Urobili: 0.2 mg/dL   Blood: x / Protein: 300 mg/dL / Nitrite: Positive   Leuk Esterase: Moderate / RBC: tntc /HPF / WBC 10-20 /HPF   Sq Epi: x / Non Sq Epi: x / Bacteria: Moderate /HPF

## 2025-07-10 ENCOUNTER — APPOINTMENT (OUTPATIENT)
Dept: OTHER | Facility: CLINIC | Age: 35
End: 2025-07-10

## 2025-07-10 ENCOUNTER — NON-APPOINTMENT (OUTPATIENT)
Age: 35
End: 2025-07-10

## 2025-07-10 VITALS
DIASTOLIC BLOOD PRESSURE: 55 MMHG | HEART RATE: 99 BPM | OXYGEN SATURATION: 98 % | SYSTOLIC BLOOD PRESSURE: 106 MMHG | RESPIRATION RATE: 17 BRPM | TEMPERATURE: 99 F

## 2025-07-10 LAB
ADD ON TEST-SPECIMEN IN LAB: SIGNIFICANT CHANGE UP
BLD GP AB SCN SERPL QL: NEGATIVE — SIGNIFICANT CHANGE UP
RH IG SCN BLD-IMP: POSITIVE — SIGNIFICANT CHANGE UP

## 2025-07-10 PROCEDURE — 99233 SBSQ HOSP IP/OBS HIGH 50: CPT | Mod: GC

## 2025-07-10 PROCEDURE — 71046 X-RAY EXAM CHEST 2 VIEWS: CPT | Mod: 26

## 2025-07-10 RX ORDER — METRONIDAZOLE 250 MG
1 TABLET ORAL
Qty: 10 | Refills: 0
Start: 2025-07-10 | End: 2025-07-14

## 2025-07-10 RX ORDER — PRENATAL 136/IRON/FOLIC ACID 27 MG-1 MG
1 TABLET ORAL DAILY
Refills: 0 | Status: DISCONTINUED | OUTPATIENT
Start: 2025-07-10 | End: 2025-07-10

## 2025-07-10 RX ORDER — ASPIRIN 325 MG
81 TABLET ORAL DAILY
Refills: 0 | Status: DISCONTINUED | OUTPATIENT
Start: 2025-07-10 | End: 2025-07-10

## 2025-07-10 RX ADMIN — Medication 81 MILLIGRAM(S): at 13:06

## 2025-07-10 RX ADMIN — CEFTRIAXONE 100 MILLIGRAM(S): 500 INJECTION, POWDER, FOR SOLUTION INTRAMUSCULAR; INTRAVENOUS at 15:25

## 2025-07-10 RX ADMIN — Medication 500 MILLIGRAM(S): at 10:27

## 2025-07-10 RX ADMIN — Medication 1 LOZENGE: at 15:24

## 2025-07-10 RX ADMIN — Medication 1 LOZENGE: at 06:31

## 2025-07-10 RX ADMIN — Medication 100 MILLIGRAM(S): at 06:31

## 2025-07-10 RX ADMIN — Medication 1 TABLET(S): at 13:06

## 2025-07-10 RX ADMIN — Medication 100 MILLIGRAM(S): at 15:24

## 2025-07-10 NOTE — PROGRESS NOTE ADULT - ATTENDING COMMENTS
Pt with complicated Ob history as outlined above.  In short, multiple fetal anomalies in the setting of previa with 3 prior C-sections and high risk of accreta (although no sonographic signs currently).  Reiterated previously reviewed grave fetal prognosis and high risk maternal surgical situation and prolonging gestation unlikely to significantly improved fetal outcome although can increase maternal risk.  Reviewed that maternal morbidity reduction Is best accomplished with planned  and if she does not desire it at this time, would not recommend going past 33-34 weeks.  Patient expressed understanding but remains undecided.  Continued to elect no fetal monitoring understanding risk of IUFD.   Will d/c to home with follow up on Monday in [facility] and will readdress timing of delivery.

## 2025-07-10 NOTE — PROGRESS NOTE ADULT - SUBJECTIVE AND OBJECTIVE BOX
PGY 3 Antepartum Note    Patient seen and examined at bedside in NAD. Denies any CTX, LOF or VB.  Reports good fetal movement.    T(F): 98.7 (06:28)  HR: 97 (06:28)  BP: 106/58 (06:28)  RR: 18 (06:28)        Gen: NAD  Cardio:  clinically well perfused  Pulm: saturating well on RA  Abd: gravid, nontender, no palpable contractions  Ext: no edema,  no calf enderness  SVE: deferred    Medications:  benzocaine/menthol Lozenge: 1 Lozenge (07-10-25 @ 06:31),  1 Lozenge (07-09-25 @ 21:41)  cefTRIAXone   IVPB: 100 mL/Hr (07-09-25 @ 14:53),  100 mL/Hr (07-08-25 @ 15:20)  cefTRIAXone   IVPB: 100 mL/Hr (07-07-25 @ 16:16)  metroNIDAZOLE    Tablet: 500 milliGRAM(s) (07-09-25 @ 21:41),  500 milliGRAM(s) (07-09-25 @ 09:46),  500 milliGRAM(s) (07-08-25 @ 20:32)      Labs:

## 2025-07-10 NOTE — DISCHARGE NOTE ANTEPARTUM - CARE PLAN
1 Principal Discharge DX:	Vaginal bleeding in pregnant patient after first trimester with placenta previa  Assessment and plan of treatment:	- No longer bleeding  Secondary Diagnosis:	Other known or suspected fetal abnormality affecting management of mother  Assessment and plan of treatment:	- Return to clinic on Monday

## 2025-07-10 NOTE — DISCHARGE NOTE ANTEPARTUM - HOSPITAL COURSE
34yo  @ 28w2d admission for vaginal bleeding with anterior placenta previa on . Fetus has suspected Trisomy 15, EFW 890g, AC 5%, anhydramnios. MR Placenta: No evidence of placenta accreta. Fetal echo: Mild to moderate biventricular hypertrophy with normal systolic function. Trivial circumferential pericardial effusion. Started on Rocephin  for asymptomatic cystitis (+bacteria, + nitrates). Patient started on Flagyl for + trichomonas. Ucx  wnl. Pt doing well today, denies vaginal bleeding. Patient to follow up with C on Monday.    34yo  @ 28w2d admission for vaginal bleeding with anterior placenta previa on . Fetus has suspected Trisomy 15, EFW 890g, AC 5%, anhydramnios. MR Placenta: No evidence of placenta accreta. Fetal echo: Mild to moderate biventricular hypertrophy with normal systolic function. Trivial circumferential pericardial effusion. Started on Rocephin  for asymptomatic cystitis (+bacteria, + nitrates). Patient started on Flagyl for + trichomonas. Ucx  wnl. Pt doing well today, denies vaginal bleeding. Patient to follow up with [facility] on Monday.     Patient noted cough on discharge. Expanded RVP performed positive parainfluenza virus. No focal consolidations on CXR. Precautions given.

## 2025-07-10 NOTE — DISCHARGE NOTE ANTEPARTUM - ADDITIONAL INSTRUCTIONS
Please return for  vaginal bleeding similar to that of a period, leaking/gush of fluid, regular contractions occurring 4-5 minutes for one hour or requiring pain medication.  Follow up on Monday at the high risk clinic.

## 2025-07-10 NOTE — DISCHARGE NOTE ANTEPARTUM - PROVIDER TOKENS
FREE:[LAST:[[facility]],PHONE:[(   )    -],FAX:[(   )    -],ADDRESS:[[place] [address]  [phone]],SCHEDULEDAPPT:[07/14/2025]]

## 2025-07-10 NOTE — DISCHARGE NOTE ANTEPARTUM - MEDICATION SUMMARY - MEDICATIONS TO TAKE
I will START or STAY ON the medications listed below when I get home from the hospital:    metroNIDAZOLE 500 mg oral tablet  -- 1 tab(s) by mouth every 12 hours  -- Indication: For trichomoniasis    aspirin 81 mg oral tablet  -- orally once a day  -- Indication: For Pregnancy    PNV Prenatal oral tablet  -- 1 tab(s) by mouth once a day  -- Indication: For Pregnancy

## 2025-07-10 NOTE — DISCHARGE NOTE ANTEPARTUM - FINANCIAL ASSISTANCE
[facility] provides services at a reduced cost to those who are determined to be eligible through [facility]’s financial assistance program. Information regarding [facility]’s financial assistance program can be found by going to [email or web address] or by calling [phone].

## 2025-07-10 NOTE — DISCHARGE NOTE ANTEPARTUM - PATIENT PORTAL LINK FT
You can access the FollowMyHealth Patient Portal offered by [facility] by registering at the following website: [email or web address]. By joining [employer]’s FollowMyHealth portal, you will also be able to view your health information using other applications (apps) compatible with our system.

## 2025-07-10 NOTE — PROGRESS NOTE ADULT - ASSESSMENT
36yo  @ 28w1d admission for vaginal bleeding with placenta previa on . Pt doing well today, denies vaginal bleeding.      #placenta previa  - ATU (): breech. Anterior previa. anhydramnios UADwnl   - ATU (): Vtx, Anterior previa, EFW: 890g, 11%,  AC: 5%,  anhydramnios, BPP   - MR Placenta: No evidence of placenta accreta.  - pad counts       #Fetal wellbeing  - No fetal monitoring till workup of PAS completed  - fetal echo: Mild to moderate biventricular hypertrophy with normal systolic function. Trivial circumferential pericardial effusion  - NICU consulted    #UTI  - c/w ceftriaxone (-)  - UA(): +bacteria, + nitrites,   - f/u UCx ()    #Maternal wellbeing  - Regular diet  - HSQ/SCDs for DVT ppx  - PNV/Iron      Mary Oconnell PGY3  
34yo  @ 28w2d admission for vaginal bleeding with placenta previa on . Pt doing well today, denies vaginal bleeding.      #placenta previa  - ATU (): breech. Anterior previa. anhydramnios UADwnl   - ATU (): Vtx, Anterior previa, EFW: 890g, 11%,  AC: 5%,  anhydramnios, BPP /8  - MR Placenta: No evidence of placenta accreta.  - pad counts       #Fetal wellbeing  - No fetal monitoring till workup of PAS completed  - fetal echo: Mild to moderate biventricular hypertrophy with normal systolic function. Trivial circumferential pericardial effusion  - NICU consulted    #UTI  - c/w ceftriaxone (-), consider d/c abx  - UA(): +bacteria, + nitrites,   - UCx (): Normal    #Maternal wellbeing  - Regular diet  - HSQ/SCDs for DVT ppx  - PNV/Iron      Mary Oconnell PGY3
36yo  @ 28w admission for vaginal bleeding with placenta previa on . Today pt reports seeing blood on her tissue when she wipes but denies any vaginal bleeding inbetween.      #placenta previa  - ATU (): Complete breech. Anterior previa. anhydramnios UADwnl   - ATU (): Vtx, Anterior previa, EFW: 890g, 11%,  AC: 5%,  anhydramnios, BPP   - F/u MR Placenta to assess for PAS  - pad counts   - f/u AM CBC    #Fetal wellbeing  - No fetal monitoring till workup of PAS completed  - f/u fetal echo  - for NICU consult    #UTI  - c/w ceftriaxone (-)  - UA(): +bacteria, + nitrites,   - f/u UCx ()    #Maternal wellbeing  - Regular diet  - HSQ/SCDs for DVT ppx  - PNV/Iron      Mary Oconnell PGY3

## 2025-07-10 NOTE — PROGRESS NOTE ADULT - NSPROGADDITIONALINFOA_GEN_ALL_CORE
=============MFM FELLOW ADDENDUM====    Assessment	  36 yo  (prior CD X 3, AB X 3 ) at 28w2d with anterior placenta previa with Trisomy 15 on NIPS with fetal cardiomegaly, pericardial effusion, multicystic kidneys and anhydramnios admitted for c/o for vaginal bleeding. VS stable, no vaginal bleeding now.  Patient extensively counselled yesterday regarding fetal prognosis with positive Trisomy 15 on NIPS with fetal cardiomegaly, pericardial effusion, multicystic kidneys and anhydramnios at this gestational age. Patient declines termination of pregnancy. Counselled that she has underlying anterior placenta previa and PAS placenta accreta syndrome) cannot be ruled out till further work up is done. In case she ends up in CD again in this pregnancy for fetal indications without full work up for ruling ou PAS, it would be a risk to the patients' life. She understands and agrees to get full work up for PAS including fetal MRI for placenta invasiveness and fetal ECHO before proceeding with fetal monitoring . Comfort care discussed with the patient as well. All questions were answered and she agreed to hold off for fetal monitoring until work up fro PAS is completed      Recommendation  -No inpatient fetal monitoring  - follow up   -Discharge home today  -Follow up in clinic next week Monday    Patient seen and counselled with Dr. Goldstein-ELIJAH Attending     Danielle Shukla-ELIJAH fellow -PGY 5 =============MFM FELLOW ADDENDUM====    Assessment	  36 yo  (prior CD X 3, AB X 3 ) at 28w2d with anterior placenta previa with Trisomy 15 on NIPS with fetal cardiomegaly, pericardial effusion, multicystic kidneys and anhydramnios admitted for c/o for vaginal bleeding. VS stable, no vaginal bleeding now.  Patient extensively counselled yesterday and today regarding extremely poor fetal prognosis with positive Trisomy 15 on NIPS with possible lethal fetal anomalies. The patient has had 3 prior  sections and with anterior previa will need  delivery.  There is potential for heavy bleeding, multiple blood transfusions, possible hysterectomy. She was explained the options of planned early delivery vs planned delivery later in the pregnancy round 33-34 weeks. She understands that prolonging this pregnancy till 34 weeks won't improve the fetal prognosis though. Patient understands and still declines termination of pregnancy but agrees for no inpatient fetal monitoring. Agreed for outpatient follow up and understands will be at a risk for fetal demise in future. Undecided about scheduling the time of delivery yet and desires more time to think about it.     Recommendation  -No inpatient fetal monitoring  - follow up   -Discharge home today  -Follow up in clinic next week Monday    Patient seen and counselled with Dr. Goldstein-ELIJAH Attending     Danielle Shukla-ELIJAH fellow -PGY 5

## 2025-07-10 NOTE — DISCHARGE NOTE ANTEPARTUM - CARE PROVIDER_API CALL
[facility],   Oncology Building, [facility]  [address] [place] [id number]  [phone]  Phone: (   )    -  Fax: (   )    -  Scheduled Appointment: 07/14/2025

## 2025-07-11 ENCOUNTER — APPOINTMENT (OUTPATIENT)
Dept: ANTEPARTUM | Facility: CLINIC | Age: 35
End: 2025-07-11

## 2025-07-11 ENCOUNTER — APPOINTMENT (OUTPATIENT)
Dept: MRI IMAGING | Facility: CLINIC | Age: 35
End: 2025-07-11

## 2025-07-14 ENCOUNTER — OUTPATIENT (OUTPATIENT)
Dept: OUTPATIENT SERVICES | Facility: HOSPITAL | Age: 35
LOS: 1 days | End: 2025-07-14

## 2025-07-14 ENCOUNTER — APPOINTMENT (OUTPATIENT)
Dept: ANTEPARTUM | Facility: HOSPITAL | Age: 35
End: 2025-07-14
Payer: MEDICAID

## 2025-07-14 VITALS
TEMPERATURE: 97.2 F | HEIGHT: 64 IN | WEIGHT: 234 LBS | DIASTOLIC BLOOD PRESSURE: 74 MMHG | HEART RATE: 82 BPM | SYSTOLIC BLOOD PRESSURE: 106 MMHG | BODY MASS INDEX: 39.95 KG/M2

## 2025-07-14 DIAGNOSIS — Z98.891 HISTORY OF UTERINE SCAR FROM PREVIOUS SURGERY: Chronic | ICD-10-CM

## 2025-07-14 PROBLEM — D50.8 OTHER IRON DEFICIENCY ANEMIA: Status: ACTIVE | Noted: 2025-07-14

## 2025-07-14 PROBLEM — D25.9 UTERINE FIBROID: Status: ACTIVE | Noted: 2025-07-01

## 2025-07-14 PROBLEM — O28.5 ABNORMAL CHROMOSOMAL AND GENETIC FINDING ON ANTENATAL SCREENING MOTHER: Status: ACTIVE | Noted: 2025-07-01

## 2025-07-14 PROBLEM — O99.210 OBESITY IN PREGNANCY: Status: ACTIVE | Noted: 2025-07-14

## 2025-07-14 PROBLEM — J45.909 ASTHMA: Status: ACTIVE | Noted: 2025-07-01

## 2025-07-14 PROBLEM — O35.9XX0 FETAL ABNORMALITY AFFECTING MANAGEMENT OF MOTHER, SINGLE OR UNSPECIFIED FETUS: Status: ACTIVE | Noted: 2025-07-01

## 2025-07-14 PROCEDURE — 99213 OFFICE O/P EST LOW 20 MIN: CPT | Mod: 25,GC

## 2025-07-14 RX ORDER — CHLORHEXIDINE GLUCONATE 4 %
325 (65 FE) LIQUID (ML) TOPICAL DAILY
Qty: 30 | Refills: 5 | Status: ACTIVE | COMMUNITY
Start: 2025-07-14 | End: 2026-01-10

## 2025-07-15 ENCOUNTER — NON-APPOINTMENT (OUTPATIENT)
Age: 35
End: 2025-07-15

## 2025-07-15 DIAGNOSIS — O35.9XX0 MATERNAL CARE FOR (SUSPECTED) FETAL ABNORMALITY AND DAMAGE, UNSPECIFIED, NOT APPLICABLE OR UNSPECIFIED: ICD-10-CM

## 2025-07-15 DIAGNOSIS — O35.EXX0 MATERNAL CARE FOR OTHER (SUSPECTED) FETAL ABNORMALITY AND DAMAGE, FETAL GENITOURINARY ANOMALIES, NOT APPLICABLE OR UNSPECIFIED: ICD-10-CM

## 2025-07-15 DIAGNOSIS — O28.5 ABNORMAL CHROMOSOMAL AND GENETIC FINDING ON ANTENATAL SCREENING OF MOTHER: ICD-10-CM

## 2025-07-15 DIAGNOSIS — O43.109 MALFORMATION OF PLACENTA, UNSPECIFIED, UNSPECIFIED TRIMESTER: ICD-10-CM

## 2025-07-15 DIAGNOSIS — R79.89 OTHER SPECIFIED ABNORMAL FINDINGS OF BLOOD CHEMISTRY: ICD-10-CM

## 2025-07-15 DIAGNOSIS — D25.9 LEIOMYOMA OF UTERUS, UNSPECIFIED: ICD-10-CM

## 2025-07-15 DIAGNOSIS — O09.93 SUPERVISION OF HIGH RISK PREGNANCY, UNSPECIFIED, THIRD TRIMESTER: ICD-10-CM

## 2025-07-15 DIAGNOSIS — D50.8 OTHER IRON DEFICIENCY ANEMIAS: ICD-10-CM

## 2025-07-15 DIAGNOSIS — J45.909 UNSPECIFIED ASTHMA, UNCOMPLICATED: ICD-10-CM

## 2025-07-17 ENCOUNTER — APPOINTMENT (OUTPATIENT)
Dept: PEDIATRIC NEPHROLOGY | Facility: CLINIC | Age: 35
End: 2025-07-17

## 2025-07-18 ENCOUNTER — ASOB RESULT (OUTPATIENT)
Age: 35
End: 2025-07-18

## 2025-07-18 ENCOUNTER — APPOINTMENT (OUTPATIENT)
Dept: ANTEPARTUM | Facility: CLINIC | Age: 35
End: 2025-07-18

## 2025-07-18 PROCEDURE — 76820 UMBILICAL ARTERY ECHO: CPT

## 2025-07-18 PROCEDURE — 99215 OFFICE O/P EST HI 40 MIN: CPT | Mod: 25

## 2025-07-18 PROCEDURE — 76819 FETAL BIOPHYS PROFIL W/O NST: CPT

## 2025-07-21 ENCOUNTER — APPOINTMENT (OUTPATIENT)
Dept: OTHER | Facility: CLINIC | Age: 35
End: 2025-07-21
Payer: MEDICAID

## 2025-07-21 DIAGNOSIS — Z76.81 EXPECTANT PARENT(S) PREBIRTH PEDIATRICIAN VISIT: ICD-10-CM

## 2025-07-21 PROCEDURE — 99205 OFFICE O/P NEW HI 60 MIN: CPT | Mod: 95

## 2025-07-22 ENCOUNTER — ASOB RESULT (OUTPATIENT)
Age: 35
End: 2025-07-22

## 2025-07-22 ENCOUNTER — APPOINTMENT (OUTPATIENT)
Dept: ANTEPARTUM | Facility: CLINIC | Age: 35
End: 2025-07-22
Payer: MEDICAID

## 2025-07-22 PROCEDURE — 76820 UMBILICAL ARTERY ECHO: CPT

## 2025-07-22 PROCEDURE — 76819 FETAL BIOPHYS PROFIL W/O NST: CPT

## 2025-07-25 ENCOUNTER — APPOINTMENT (OUTPATIENT)
Dept: ANTEPARTUM | Facility: CLINIC | Age: 35
End: 2025-07-25
Payer: MEDICAID

## 2025-07-25 ENCOUNTER — ASOB RESULT (OUTPATIENT)
Age: 35
End: 2025-07-25

## 2025-07-25 PROCEDURE — 76820 UMBILICAL ARTERY ECHO: CPT

## 2025-07-25 PROCEDURE — 76819 FETAL BIOPHYS PROFIL W/O NST: CPT

## 2025-07-28 ENCOUNTER — APPOINTMENT (OUTPATIENT)
Dept: GYNECOLOGIC ONCOLOGY | Facility: CLINIC | Age: 35
End: 2025-07-28
Payer: MEDICAID

## 2025-07-28 ENCOUNTER — RESULT REVIEW (OUTPATIENT)
Age: 35
End: 2025-07-28

## 2025-07-28 ENCOUNTER — APPOINTMENT (OUTPATIENT)
Dept: ANTEPARTUM | Facility: HOSPITAL | Age: 35
End: 2025-07-28
Payer: MEDICAID

## 2025-07-28 ENCOUNTER — OUTPATIENT (OUTPATIENT)
Dept: OUTPATIENT SERVICES | Facility: HOSPITAL | Age: 35
LOS: 1 days | End: 2025-07-28

## 2025-07-28 VITALS
WEIGHT: 236 LBS | SYSTOLIC BLOOD PRESSURE: 117 MMHG | HEART RATE: 89 BPM | HEIGHT: 64 IN | OXYGEN SATURATION: 99 % | BODY MASS INDEX: 40.29 KG/M2 | TEMPERATURE: 97.7 F | DIASTOLIC BLOOD PRESSURE: 76 MMHG

## 2025-07-28 VITALS
WEIGHT: 235 LBS | HEART RATE: 83 BPM | SYSTOLIC BLOOD PRESSURE: 113 MMHG | DIASTOLIC BLOOD PRESSURE: 80 MMHG | BODY MASS INDEX: 40.12 KG/M2 | TEMPERATURE: 97.8 F | HEIGHT: 64 IN

## 2025-07-28 DIAGNOSIS — O43.109 MALFORMATION OF PLACENTA, UNSPECIFIED, UNSPECIFIED TRIMESTER: ICD-10-CM

## 2025-07-28 DIAGNOSIS — R87.619 UNSPECIFIED ABNORMAL CYTOLOGICAL FINDINGS IN SPECIMENS FROM CERVIX UTERI: ICD-10-CM

## 2025-07-28 LAB — CYTOLOGY CVX/VAG DOC THIN PREP: NORMAL

## 2025-07-28 PROCEDURE — 99459 PELVIC EXAMINATION: CPT

## 2025-07-28 PROCEDURE — 99205 OFFICE O/P NEW HI 60 MIN: CPT

## 2025-07-28 PROCEDURE — 99213 OFFICE O/P EST LOW 20 MIN: CPT | Mod: GC,25

## 2025-07-29 ENCOUNTER — APPOINTMENT (OUTPATIENT)
Dept: ANTEPARTUM | Facility: CLINIC | Age: 35
End: 2025-07-29
Payer: MEDICAID

## 2025-07-29 ENCOUNTER — ASOB RESULT (OUTPATIENT)
Age: 35
End: 2025-07-29

## 2025-07-29 DIAGNOSIS — O35.9XX0 MATERNAL CARE FOR (SUSPECTED) FETAL ABNORMALITY AND DAMAGE, UNSPECIFIED, NOT APPLICABLE OR UNSPECIFIED: ICD-10-CM

## 2025-07-29 DIAGNOSIS — O09.90 SUPERVISION OF HIGH RISK PREGNANCY, UNSPECIFIED, UNSPECIFIED TRIMESTER: ICD-10-CM

## 2025-07-29 DIAGNOSIS — O44.00 COMPLETE PLACENTA PREVIA NOS OR WITHOUT HEMORRHAGE, UNSPECIFIED TRIMESTER: ICD-10-CM

## 2025-07-29 PROCEDURE — 76820 UMBILICAL ARTERY ECHO: CPT

## 2025-07-29 PROCEDURE — 76819 FETAL BIOPHYS PROFIL W/O NST: CPT

## 2025-07-30 LAB — HPV HIGH+LOW RISK DNA PNL CVX: SIGNIFICANT CHANGE UP

## 2025-07-31 LAB — CYTOLOGY SPEC DOC CYTO: SIGNIFICANT CHANGE UP

## 2025-08-01 ENCOUNTER — ASOB RESULT (OUTPATIENT)
Age: 35
End: 2025-08-01

## 2025-08-01 ENCOUNTER — APPOINTMENT (OUTPATIENT)
Dept: ANTEPARTUM | Facility: CLINIC | Age: 35
End: 2025-08-01
Payer: MEDICAID

## 2025-08-01 PROCEDURE — 76820 UMBILICAL ARTERY ECHO: CPT

## 2025-08-01 PROCEDURE — 99213 OFFICE O/P EST LOW 20 MIN: CPT | Mod: 25

## 2025-08-01 PROCEDURE — 76819 FETAL BIOPHYS PROFIL W/O NST: CPT

## 2025-08-04 ENCOUNTER — APPOINTMENT (OUTPATIENT)
Dept: MATERNAL FETAL MEDICINE | Facility: HOSPITAL | Age: 35
End: 2025-08-04
Payer: MEDICAID

## 2025-08-04 ENCOUNTER — ASOB RESULT (OUTPATIENT)
Age: 35
End: 2025-08-04

## 2025-08-04 PROCEDURE — 76819 FETAL BIOPHYS PROFIL W/O NST: CPT | Mod: 26

## 2025-08-05 ENCOUNTER — APPOINTMENT (OUTPATIENT)
Dept: ANTEPARTUM | Facility: CLINIC | Age: 35
End: 2025-08-05

## 2025-08-05 ENCOUNTER — OUTPATIENT (OUTPATIENT)
Dept: OUTPATIENT SERVICES | Facility: HOSPITAL | Age: 35
LOS: 1 days | End: 2025-08-05

## 2025-08-05 VITALS
DIASTOLIC BLOOD PRESSURE: 72 MMHG | RESPIRATION RATE: 16 BRPM | HEIGHT: 65 IN | WEIGHT: 233.91 LBS | TEMPERATURE: 98 F | HEART RATE: 82 BPM | OXYGEN SATURATION: 100 % | SYSTOLIC BLOOD PRESSURE: 107 MMHG

## 2025-08-05 DIAGNOSIS — Z98.890 OTHER SPECIFIED POSTPROCEDURAL STATES: Chronic | ICD-10-CM

## 2025-08-05 DIAGNOSIS — Z98.891 HISTORY OF UTERINE SCAR FROM PREVIOUS SURGERY: Chronic | ICD-10-CM

## 2025-08-05 DIAGNOSIS — O43.109 MALFORMATION OF PLACENTA, UNSPECIFIED, UNSPECIFIED TRIMESTER: ICD-10-CM

## 2025-08-05 LAB
APPEARANCE UR: CLEAR — SIGNIFICANT CHANGE UP
BACTERIA # UR AUTO: NEGATIVE /HPF — SIGNIFICANT CHANGE UP
BASOPHILS # BLD AUTO: 0.02 K/UL — SIGNIFICANT CHANGE UP (ref 0–0.2)
BASOPHILS NFR BLD AUTO: 0.2 % — SIGNIFICANT CHANGE UP (ref 0–2)
BILIRUB UR-MCNC: NEGATIVE — SIGNIFICANT CHANGE UP
BLD GP AB SCN SERPL QL: NEGATIVE — SIGNIFICANT CHANGE UP
CAST: 2 /LPF — SIGNIFICANT CHANGE UP (ref 0–4)
COLOR SPEC: YELLOW — SIGNIFICANT CHANGE UP
DIFF PNL FLD: NEGATIVE — SIGNIFICANT CHANGE UP
EOSINOPHIL # BLD AUTO: 0.03 K/UL — SIGNIFICANT CHANGE UP (ref 0–0.5)
EOSINOPHIL NFR BLD AUTO: 0.4 % — SIGNIFICANT CHANGE UP (ref 0–6)
GLUCOSE UR QL: NEGATIVE MG/DL — SIGNIFICANT CHANGE UP
HCT VFR BLD CALC: 33.1 % — LOW (ref 34.5–45)
HGB BLD-MCNC: 10.5 G/DL — LOW (ref 11.5–15.5)
IMM GRANULOCYTES NFR BLD AUTO: 0.4 % — SIGNIFICANT CHANGE UP (ref 0–0.9)
KETONES UR QL: ABNORMAL MG/DL
LEUKOCYTE ESTERASE UR-ACNC: ABNORMAL
LYMPHOCYTES # BLD AUTO: 1.47 K/UL — SIGNIFICANT CHANGE UP (ref 1–3.3)
LYMPHOCYTES # BLD AUTO: 18 % — SIGNIFICANT CHANGE UP (ref 13–44)
MCHC RBC-ENTMCNC: 27.9 PG — SIGNIFICANT CHANGE UP (ref 27–34)
MCHC RBC-ENTMCNC: 31.7 G/DL — LOW (ref 32–36)
MCV RBC AUTO: 88 FL — SIGNIFICANT CHANGE UP (ref 80–100)
MONOCYTES # BLD AUTO: 0.73 K/UL — SIGNIFICANT CHANGE UP (ref 0–0.9)
MONOCYTES NFR BLD AUTO: 8.9 % — SIGNIFICANT CHANGE UP (ref 2–14)
NEUTROPHILS # BLD AUTO: 5.9 K/UL — SIGNIFICANT CHANGE UP (ref 1.8–7.4)
NEUTROPHILS NFR BLD AUTO: 72.1 % — SIGNIFICANT CHANGE UP (ref 43–77)
NITRITE UR-MCNC: NEGATIVE — SIGNIFICANT CHANGE UP
PH UR: 6 — SIGNIFICANT CHANGE UP (ref 5–8)
PLATELET # BLD AUTO: 329 K/UL — SIGNIFICANT CHANGE UP (ref 150–400)
PROT UR-MCNC: 30 MG/DL
RBC # BLD: 3.76 M/UL — LOW (ref 3.8–5.2)
RBC # FLD: 15.1 % — HIGH (ref 10.3–14.5)
RBC CASTS # UR COMP ASSIST: 0 /HPF — SIGNIFICANT CHANGE UP (ref 0–4)
RETICS #: 85.9 K/UL — SIGNIFICANT CHANGE UP (ref 25–125)
RETICS/RBC NFR: 2.3 % — SIGNIFICANT CHANGE UP (ref 0.5–2.5)
RH IG SCN BLD-IMP: POSITIVE — SIGNIFICANT CHANGE UP
SP GR SPEC: 1.03 — SIGNIFICANT CHANGE UP (ref 1–1.03)
SQUAMOUS # UR AUTO: 3 /HPF — SIGNIFICANT CHANGE UP (ref 0–5)
UROBILINOGEN FLD QL: 1 MG/DL — SIGNIFICANT CHANGE UP (ref 0.2–1)
WBC # BLD: 8.18 K/UL — SIGNIFICANT CHANGE UP (ref 3.8–10.5)
WBC # FLD AUTO: 8.18 K/UL — SIGNIFICANT CHANGE UP (ref 3.8–10.5)
WBC UR QL: 1 /HPF — SIGNIFICANT CHANGE UP (ref 0–5)

## 2025-08-05 RX ORDER — CITRIC ACID/SODIUM CITRATE 300-500 MG
30 SOLUTION, ORAL ORAL ONCE
Refills: 0 | Status: DISCONTINUED | OUTPATIENT
Start: 2025-08-18 | End: 2025-08-18

## 2025-08-05 RX ORDER — SODIUM CHLORIDE 9 G/1000ML
1000 INJECTION, SOLUTION INTRAVENOUS
Refills: 0 | Status: DISCONTINUED | OUTPATIENT
Start: 2025-08-18 | End: 2025-08-18

## 2025-08-05 RX ORDER — ASPIRIN 325 MG
0 TABLET ORAL
Refills: 0 | DISCHARGE

## 2025-08-05 RX ORDER — PRENATAL 136/IRON/FOLIC ACID 27 MG-1 MG
1 TABLET ORAL
Refills: 0 | DISCHARGE

## 2025-08-05 RX ORDER — FERROUS SULFATE 137(45) MG
1 TABLET, EXTENDED RELEASE ORAL
Refills: 0 | DISCHARGE

## 2025-08-06 LAB
ALBUMIN SERPL ELPH-MCNC: 3.3 G/DL — SIGNIFICANT CHANGE UP (ref 3.3–5)
ALP SERPL-CCNC: 82 U/L — SIGNIFICANT CHANGE UP (ref 40–120)
ALT FLD-CCNC: 12 U/L — SIGNIFICANT CHANGE UP (ref 10–40)
ANION GAP SERPL CALC-SCNC: 13 MMOL/L — SIGNIFICANT CHANGE UP (ref 5–17)
AST SERPL-CCNC: 16 U/L — SIGNIFICANT CHANGE UP (ref 10–35)
BILIRUB SERPL-MCNC: <0.2 MG/DL — SIGNIFICANT CHANGE UP (ref 0.2–1.2)
BUN SERPL-MCNC: 10 MG/DL — SIGNIFICANT CHANGE UP (ref 7–23)
CALCIUM SERPL-MCNC: 8.7 MG/DL — SIGNIFICANT CHANGE UP (ref 8.4–10.5)
CHLORIDE SERPL-SCNC: 104 MMOL/L — SIGNIFICANT CHANGE UP (ref 96–108)
CO2 SERPL-SCNC: 22 MMOL/L — SIGNIFICANT CHANGE UP (ref 22–31)
CREAT SERPL-MCNC: 0.81 MG/DL — SIGNIFICANT CHANGE UP (ref 0.5–1.3)
CRP SERPL-MCNC: 13 MG/L — HIGH
EGFR: 96 ML/MIN/1.73M2 — SIGNIFICANT CHANGE UP
EGFR: 96 ML/MIN/1.73M2 — SIGNIFICANT CHANGE UP
FERRITIN SERPL-MCNC: 34 NG/ML — SIGNIFICANT CHANGE UP (ref 15–150)
FOLATE SERPL-MCNC: 19.5 NG/ML — SIGNIFICANT CHANGE UP
GLUCOSE SERPL-MCNC: 70 MG/DL — SIGNIFICANT CHANGE UP (ref 70–99)
IRON SATN MFR SERPL: 12 % — LOW (ref 14–50)
IRON SATN MFR SERPL: 45 UG/DL — SIGNIFICANT CHANGE UP (ref 30–160)
POTASSIUM SERPL-MCNC: 4.1 MMOL/L — SIGNIFICANT CHANGE UP (ref 3.5–5.3)
POTASSIUM SERPL-SCNC: 4.1 MMOL/L — SIGNIFICANT CHANGE UP (ref 3.5–5.3)
PROT SERPL-MCNC: 6.2 G/DL — SIGNIFICANT CHANGE UP (ref 6–8.3)
SODIUM SERPL-SCNC: 139 MMOL/L — SIGNIFICANT CHANGE UP (ref 135–145)
TIBC SERPL-MCNC: 375 UG/DL — SIGNIFICANT CHANGE UP (ref 220–430)
UIBC SERPL-MCNC: 330 UG/DL — SIGNIFICANT CHANGE UP (ref 110–370)
VIT B12 SERPL-MCNC: 385 PG/ML — SIGNIFICANT CHANGE UP (ref 232–1245)

## 2025-08-08 ENCOUNTER — APPOINTMENT (OUTPATIENT)
Dept: ANTEPARTUM | Facility: CLINIC | Age: 35
End: 2025-08-08

## 2025-08-08 ENCOUNTER — ASOB RESULT (OUTPATIENT)
Age: 35
End: 2025-08-08

## 2025-08-08 PROCEDURE — 76819 FETAL BIOPHYS PROFIL W/O NST: CPT | Mod: 59

## 2025-08-08 PROCEDURE — 76816 OB US FOLLOW-UP PER FETUS: CPT

## 2025-08-11 ENCOUNTER — APPOINTMENT (OUTPATIENT)
Dept: MATERNAL FETAL MEDICINE | Facility: HOSPITAL | Age: 35
End: 2025-08-11

## 2025-08-11 ENCOUNTER — APPOINTMENT (OUTPATIENT)
Dept: MATERNAL FETAL MEDICINE | Facility: HOSPITAL | Age: 35
End: 2025-08-11
Payer: MEDICAID

## 2025-08-11 ENCOUNTER — OUTPATIENT (OUTPATIENT)
Dept: OUTPATIENT SERVICES | Facility: HOSPITAL | Age: 35
LOS: 1 days | End: 2025-08-11

## 2025-08-11 ENCOUNTER — APPOINTMENT (OUTPATIENT)
Dept: ANTEPARTUM | Facility: HOSPITAL | Age: 35
End: 2025-08-11
Payer: MEDICAID

## 2025-08-11 ENCOUNTER — ASOB RESULT (OUTPATIENT)
Age: 35
End: 2025-08-11

## 2025-08-11 VITALS
HEIGHT: 64 IN | TEMPERATURE: 96.8 F | HEART RATE: 83 BPM | BODY MASS INDEX: 40.46 KG/M2 | DIASTOLIC BLOOD PRESSURE: 73 MMHG | SYSTOLIC BLOOD PRESSURE: 123 MMHG | WEIGHT: 237 LBS

## 2025-08-11 DIAGNOSIS — K42.9 UMBILICAL HERNIA W/OUT OBSTRUCTION OR GANGRENE: ICD-10-CM

## 2025-08-11 DIAGNOSIS — O35.EXX0 MATERNAL CARE FOR OTHER (SUSPECTED) FETAL ABNORMALITY AND DAMAGE, FETAL GENITOURINARY ANOMALIES, NOT APPLICABLE OR UNSPECIFIED: ICD-10-CM

## 2025-08-11 DIAGNOSIS — R79.89 OTHER SPECIFIED ABNORMAL FINDINGS OF BLOOD CHEMISTRY: ICD-10-CM

## 2025-08-11 DIAGNOSIS — Z98.890 OTHER SPECIFIED POSTPROCEDURAL STATES: Chronic | ICD-10-CM

## 2025-08-11 DIAGNOSIS — O09.93 SUPERVISION OF HIGH RISK PREGNANCY, UNSPECIFIED, THIRD TRIMESTER: ICD-10-CM

## 2025-08-11 PROCEDURE — 99213 OFFICE O/P EST LOW 20 MIN: CPT | Mod: GC,25

## 2025-08-11 PROCEDURE — 76820 UMBILICAL ARTERY ECHO: CPT | Mod: 26

## 2025-08-11 PROCEDURE — 76819 FETAL BIOPHYS PROFIL W/O NST: CPT | Mod: 26

## 2025-08-12 ENCOUNTER — NON-APPOINTMENT (OUTPATIENT)
Age: 35
End: 2025-08-12

## 2025-08-12 ENCOUNTER — APPOINTMENT (OUTPATIENT)
Dept: ANTEPARTUM | Facility: CLINIC | Age: 35
End: 2025-08-12

## 2025-08-12 DIAGNOSIS — O35.EXX0 MATERNAL CARE FOR OTHER (SUSPECTED) FETAL ABNORMALITY AND DAMAGE, FETAL GENITOURINARY ANOMALIES, NOT APPLICABLE OR UNSPECIFIED: ICD-10-CM

## 2025-08-12 DIAGNOSIS — R79.89 OTHER SPECIFIED ABNORMAL FINDINGS OF BLOOD CHEMISTRY: ICD-10-CM

## 2025-08-12 DIAGNOSIS — K42.9 UMBILICAL HERNIA WITHOUT OBSTRUCTION OR GANGRENE: ICD-10-CM

## 2025-08-12 DIAGNOSIS — O09.93 SUPERVISION OF HIGH RISK PREGNANCY, UNSPECIFIED, THIRD TRIMESTER: ICD-10-CM

## 2025-08-12 RX ADMIN — Medication 2 MG/2ML: at 00:00

## 2025-08-15 ENCOUNTER — ASOB RESULT (OUTPATIENT)
Age: 35
End: 2025-08-15

## 2025-08-15 ENCOUNTER — APPOINTMENT (OUTPATIENT)
Dept: ANTEPARTUM | Facility: CLINIC | Age: 35
End: 2025-08-15

## 2025-08-15 ENCOUNTER — NON-APPOINTMENT (OUTPATIENT)
Age: 35
End: 2025-08-15

## 2025-08-15 PROCEDURE — 76818 FETAL BIOPHYS PROFILE W/NST: CPT

## 2025-08-15 PROCEDURE — ZZZZZ: CPT

## 2025-08-15 RX ORDER — BETAMETHASONE SOD PHOSPH-WATER 6 MG/ML
12 VIAL (ML) INJECTION
Qty: 0 | Refills: 0 | Status: COMPLETED | OUTPATIENT
Start: 2025-08-12

## 2025-08-16 ENCOUNTER — OUTPATIENT (OUTPATIENT)
Dept: INPATIENT UNIT | Facility: HOSPITAL | Age: 35
LOS: 1 days | End: 2025-08-16

## 2025-08-16 ENCOUNTER — APPOINTMENT (OUTPATIENT)
Dept: ANTEPARTUM | Facility: CLINIC | Age: 35
End: 2025-08-16

## 2025-08-16 DIAGNOSIS — O26.899 OTHER SPECIFIED PREGNANCY RELATED CONDITIONS, UNSPECIFIED TRIMESTER: ICD-10-CM

## 2025-08-16 DIAGNOSIS — Z98.890 OTHER SPECIFIED POSTPROCEDURAL STATES: Chronic | ICD-10-CM

## 2025-08-16 DIAGNOSIS — Z98.891 HISTORY OF UTERINE SCAR FROM PREVIOUS SURGERY: Chronic | ICD-10-CM

## 2025-08-16 RX ADMIN — Medication 12 MILLIGRAM(S): at 11:05

## 2025-08-18 ENCOUNTER — INPATIENT (INPATIENT)
Facility: HOSPITAL | Age: 35
LOS: 2 days | Discharge: HOME | End: 2025-08-21
Attending: SPECIALIST | Admitting: SPECIALIST
Payer: MEDICAID

## 2025-08-18 ENCOUNTER — TRANSCRIPTION ENCOUNTER (OUTPATIENT)
Age: 35
End: 2025-08-18

## 2025-08-18 ENCOUNTER — APPOINTMENT (OUTPATIENT)
Dept: MATERNAL FETAL MEDICINE | Facility: HOSPITAL | Age: 35
End: 2025-08-18

## 2025-08-18 VITALS — DIASTOLIC BLOOD PRESSURE: 80 MMHG | SYSTOLIC BLOOD PRESSURE: 119 MMHG | HEART RATE: 88 BPM

## 2025-08-18 DIAGNOSIS — Z98.890 OTHER SPECIFIED POSTPROCEDURAL STATES: Chronic | ICD-10-CM

## 2025-08-18 DIAGNOSIS — Z98.891 HISTORY OF UTERINE SCAR FROM PREVIOUS SURGERY: Chronic | ICD-10-CM

## 2025-08-18 DIAGNOSIS — O35.9XX0 MATERNAL CARE FOR (SUSPECTED) FETAL ABNORMALITY AND DAMAGE, UNSPECIFIED, NOT APPLICABLE OR UNSPECIFIED: ICD-10-CM

## 2025-08-18 LAB
BASOPHILS # BLD AUTO: 0.02 K/UL — SIGNIFICANT CHANGE UP (ref 0–0.2)
BASOPHILS NFR BLD AUTO: 0.2 % — SIGNIFICANT CHANGE UP (ref 0–2)
BLD GP AB SCN SERPL QL: NEGATIVE — SIGNIFICANT CHANGE UP
EOSINOPHIL # BLD AUTO: 0.02 K/UL — SIGNIFICANT CHANGE UP (ref 0–0.5)
EOSINOPHIL NFR BLD AUTO: 0.2 % — SIGNIFICANT CHANGE UP (ref 0–6)
HCT VFR BLD CALC: 30.8 % — LOW (ref 34.5–45)
HGB BLD-MCNC: 9.8 G/DL — LOW (ref 11.5–15.5)
IMM GRANULOCYTES # BLD AUTO: 0.09 K/UL — HIGH (ref 0–0.07)
IMM GRANULOCYTES NFR BLD AUTO: 0.8 % — SIGNIFICANT CHANGE UP (ref 0–0.9)
LYMPHOCYTES # BLD AUTO: 2.12 K/UL — SIGNIFICANT CHANGE UP (ref 1–3.3)
LYMPHOCYTES NFR BLD AUTO: 19 % — SIGNIFICANT CHANGE UP (ref 13–44)
MCHC RBC-ENTMCNC: 27.7 PG — SIGNIFICANT CHANGE UP (ref 27–34)
MCHC RBC-ENTMCNC: 31.8 G/DL — LOW (ref 32–36)
MCV RBC AUTO: 87 FL — SIGNIFICANT CHANGE UP (ref 80–100)
MONOCYTES # BLD AUTO: 1.06 K/UL — HIGH (ref 0–0.9)
MONOCYTES NFR BLD AUTO: 9.5 % — SIGNIFICANT CHANGE UP (ref 2–14)
NEUTROPHILS # BLD AUTO: 7.86 K/UL — HIGH (ref 1.8–7.4)
NEUTROPHILS NFR BLD AUTO: 70.3 % — SIGNIFICANT CHANGE UP (ref 43–77)
NRBC # BLD AUTO: 0 K/UL — SIGNIFICANT CHANGE UP (ref 0–0)
NRBC # FLD: 0 K/UL — SIGNIFICANT CHANGE UP (ref 0–0)
NRBC BLD AUTO-RTO: 0 /100 WBCS — SIGNIFICANT CHANGE UP (ref 0–0)
PLATELET # BLD AUTO: 328 K/UL — SIGNIFICANT CHANGE UP (ref 150–400)
PMV BLD: 9 FL — SIGNIFICANT CHANGE UP (ref 7–13)
RBC # BLD: 3.54 M/UL — LOW (ref 3.8–5.2)
RBC # FLD: 14.6 % — HIGH (ref 10.3–14.5)
RH IG SCN BLD-IMP: POSITIVE — SIGNIFICANT CHANGE UP
WBC # BLD: 11.17 K/UL — HIGH (ref 3.8–10.5)
WBC # FLD AUTO: 11.17 K/UL — HIGH (ref 3.8–10.5)

## 2025-08-18 PROCEDURE — 58140 MYOMECTOMY ABDOM METHOD: CPT

## 2025-08-18 PROCEDURE — 88305 TISSUE EXAM BY PATHOLOGIST: CPT | Mod: 26

## 2025-08-18 PROCEDURE — 59514 CESAREAN DELIVERY ONLY: CPT | Mod: U7

## 2025-08-18 DEVICE — VISTASEAL FIBRIN HUMAN 10ML: Type: IMPLANTABLE DEVICE | Status: FUNCTIONAL

## 2025-08-18 DEVICE — INTERCEED 5 X 6" XL: Type: IMPLANTABLE DEVICE | Status: FUNCTIONAL

## 2025-08-18 DEVICE — SURGICEL 2 X 14": Type: IMPLANTABLE DEVICE | Status: FUNCTIONAL

## 2025-08-18 RX ORDER — OXYCODONE HYDROCHLORIDE 30 MG/1
5 TABLET ORAL ONCE
Refills: 0 | Status: DISCONTINUED | OUTPATIENT
Start: 2025-08-18 | End: 2025-08-21

## 2025-08-18 RX ORDER — ALBUTEROL SULFATE 2.5 MG/3ML
2 VIAL, NEBULIZER (ML) INHALATION EVERY 6 HOURS
Refills: 0 | Status: DISCONTINUED | OUTPATIENT
Start: 2025-08-18 | End: 2025-08-21

## 2025-08-18 RX ORDER — DEXAMETHASONE 0.5 MG/1
4 TABLET ORAL EVERY 6 HOURS
Refills: 0 | Status: DISCONTINUED | OUTPATIENT
Start: 2025-08-18 | End: 2025-08-19

## 2025-08-18 RX ORDER — FERROUS SULFATE 137(45) MG
325 TABLET, EXTENDED RELEASE ORAL DAILY
Refills: 0 | Status: DISCONTINUED | OUTPATIENT
Start: 2025-08-18 | End: 2025-08-21

## 2025-08-18 RX ORDER — ALBUTEROL SULFATE 2.5 MG/3ML
0 VIAL, NEBULIZER (ML) INHALATION
Refills: 0 | DISCHARGE

## 2025-08-18 RX ORDER — NALOXONE HYDROCHLORIDE 0.4 MG/ML
0.1 INJECTION, SOLUTION INTRAMUSCULAR; INTRAVENOUS; SUBCUTANEOUS
Refills: 0 | Status: DISCONTINUED | OUTPATIENT
Start: 2025-08-18 | End: 2025-08-19

## 2025-08-18 RX ORDER — MODIFIED LANOLIN 100 %
1 CREAM (GRAM) TOPICAL EVERY 6 HOURS
Refills: 0 | Status: DISCONTINUED | OUTPATIENT
Start: 2025-08-18 | End: 2025-08-21

## 2025-08-18 RX ORDER — SENNA 187 MG
2 TABLET ORAL AT BEDTIME
Refills: 0 | Status: DISCONTINUED | OUTPATIENT
Start: 2025-08-18 | End: 2025-08-21

## 2025-08-18 RX ORDER — ALBUTEROL SULFATE 2.5 MG/3ML
2 VIAL, NEBULIZER (ML) INHALATION
Refills: 0 | DISCHARGE

## 2025-08-18 RX ORDER — MAGNESIUM HYDROXIDE 400 MG/5ML
30 SUSPENSION ORAL
Refills: 0 | Status: DISCONTINUED | OUTPATIENT
Start: 2025-08-18 | End: 2025-08-21

## 2025-08-18 RX ORDER — OXYCODONE HYDROCHLORIDE 30 MG/1
10 TABLET ORAL
Refills: 0 | Status: DISCONTINUED | OUTPATIENT
Start: 2025-08-18 | End: 2025-08-18

## 2025-08-18 RX ORDER — SIMETHICONE 80 MG
80 TABLET,CHEWABLE ORAL EVERY 4 HOURS
Refills: 0 | Status: DISCONTINUED | OUTPATIENT
Start: 2025-08-18 | End: 2025-08-21

## 2025-08-18 RX ORDER — OXYTOCIN-SODIUM CHLORIDE 0.9% IV SOLN 30 UNIT/500ML 30-0.9/5 UT/ML-%
42 SOLUTION INTRAVENOUS
Qty: 30 | Refills: 0 | Status: DISCONTINUED | OUTPATIENT
Start: 2025-08-18 | End: 2025-08-21

## 2025-08-18 RX ORDER — ACETAMINOPHEN 500 MG/5ML
975 LIQUID (ML) ORAL
Refills: 0 | Status: DISCONTINUED | OUTPATIENT
Start: 2025-08-18 | End: 2025-08-21

## 2025-08-18 RX ORDER — OXYCODONE HYDROCHLORIDE 30 MG/1
5 TABLET ORAL
Refills: 0 | Status: COMPLETED | OUTPATIENT
Start: 2025-08-18 | End: 2025-08-25

## 2025-08-18 RX ORDER — ONDANSETRON HCL/PF 4 MG/2 ML
4 VIAL (ML) INJECTION EVERY 6 HOURS
Refills: 0 | Status: DISCONTINUED | OUTPATIENT
Start: 2025-08-18 | End: 2025-08-19

## 2025-08-18 RX ORDER — KETOROLAC TROMETHAMINE 30 MG/ML
30 INJECTION, SOLUTION INTRAMUSCULAR; INTRAVENOUS EVERY 6 HOURS
Refills: 0 | Status: DISCONTINUED | OUTPATIENT
Start: 2025-08-18 | End: 2025-08-19

## 2025-08-18 RX ORDER — DIPHENHYDRAMINE HCL 12.5MG/5ML
25 ELIXIR ORAL EVERY 6 HOURS
Refills: 0 | Status: DISCONTINUED | OUTPATIENT
Start: 2025-08-18 | End: 2025-08-21

## 2025-08-18 RX ORDER — SODIUM CHLORIDE 9 G/1000ML
1000 INJECTION, SOLUTION INTRAVENOUS
Refills: 0 | Status: DISCONTINUED | OUTPATIENT
Start: 2025-08-18 | End: 2025-08-18

## 2025-08-18 RX ORDER — NALBUPHINE HYDROCHLORIDE 10 MG/ML
2.5 INJECTION INTRAMUSCULAR; INTRAVENOUS; SUBCUTANEOUS EVERY 6 HOURS
Refills: 0 | Status: DISCONTINUED | OUTPATIENT
Start: 2025-08-18 | End: 2025-08-19

## 2025-08-18 RX ORDER — PRENATAL 136/IRON/FOLIC ACID 27 MG-1 MG
1 TABLET ORAL DAILY
Refills: 0 | Status: DISCONTINUED | OUTPATIENT
Start: 2025-08-18 | End: 2025-08-21

## 2025-08-18 RX ORDER — HEPARIN SODIUM 1000 [USP'U]/ML
10000 INJECTION INTRAVENOUS; SUBCUTANEOUS EVERY 12 HOURS
Refills: 0 | Status: DISCONTINUED | OUTPATIENT
Start: 2025-08-18 | End: 2025-08-21

## 2025-08-18 RX ORDER — OXYCODONE HYDROCHLORIDE 30 MG/1
5 TABLET ORAL
Refills: 0 | Status: DISCONTINUED | OUTPATIENT
Start: 2025-08-18 | End: 2025-08-18

## 2025-08-18 RX ORDER — IBUPROFEN 200 MG
600 TABLET ORAL EVERY 6 HOURS
Refills: 0 | Status: COMPLETED | OUTPATIENT
Start: 2025-08-18 | End: 2026-07-17

## 2025-08-18 RX ADMIN — Medication 1 APPLICATION(S): at 07:01

## 2025-08-18 RX ADMIN — OXYTOCIN-SODIUM CHLORIDE 0.9% IV SOLN 30 UNIT/500ML 42 MILLIUNIT(S)/MIN: 30-0.9/5 SOLUTION at 12:08

## 2025-08-18 RX ADMIN — Medication 975 MILLIGRAM(S): at 16:21

## 2025-08-18 RX ADMIN — SODIUM CHLORIDE 83 MILLILITER(S): 9 INJECTION, SOLUTION INTRAVENOUS at 11:15

## 2025-08-18 RX ADMIN — Medication 0.2 MILLIGRAM(S): at 14:10

## 2025-08-18 RX ADMIN — Medication 975 MILLIGRAM(S): at 16:51

## 2025-08-18 RX ADMIN — KETOROLAC TROMETHAMINE 30 MILLIGRAM(S): 30 INJECTION, SOLUTION INTRAMUSCULAR; INTRAVENOUS at 23:10

## 2025-08-18 RX ADMIN — SODIUM CHLORIDE 200 MILLILITER(S): 9 INJECTION, SOLUTION INTRAVENOUS at 07:00

## 2025-08-18 RX ADMIN — Medication 20 MILLIGRAM(S): at 07:00

## 2025-08-19 ENCOUNTER — NON-APPOINTMENT (OUTPATIENT)
Age: 35
End: 2025-08-19

## 2025-08-19 LAB
ALBUMIN SERPL ELPH-MCNC: 2.6 G/DL — LOW (ref 3.3–5)
ALP SERPL-CCNC: 60 U/L — SIGNIFICANT CHANGE UP (ref 40–120)
ALT FLD-CCNC: 9 U/L — SIGNIFICANT CHANGE UP (ref 4–33)
ANION GAP SERPL CALC-SCNC: 12 MMOL/L — SIGNIFICANT CHANGE UP (ref 7–14)
AST SERPL-CCNC: 19 U/L — SIGNIFICANT CHANGE UP (ref 4–32)
BASOPHILS # BLD AUTO: 0.05 K/UL — SIGNIFICANT CHANGE UP (ref 0–0.2)
BASOPHILS NFR BLD AUTO: 0.3 % — SIGNIFICANT CHANGE UP (ref 0–2)
BILIRUB SERPL-MCNC: 0.2 MG/DL — SIGNIFICANT CHANGE UP (ref 0.2–1.2)
BUN SERPL-MCNC: 16 MG/DL — SIGNIFICANT CHANGE UP (ref 7–23)
CALCIUM SERPL-MCNC: 8.1 MG/DL — LOW (ref 8.4–10.5)
CHLORIDE SERPL-SCNC: 101 MMOL/L — SIGNIFICANT CHANGE UP (ref 98–107)
CO2 SERPL-SCNC: 22 MMOL/L — SIGNIFICANT CHANGE UP (ref 22–31)
CREAT SERPL-MCNC: 0.76 MG/DL — SIGNIFICANT CHANGE UP (ref 0.5–1.3)
EGFR: 103 ML/MIN/1.73M2 — SIGNIFICANT CHANGE UP
EGFR: 103 ML/MIN/1.73M2 — SIGNIFICANT CHANGE UP
EOSINOPHIL # BLD AUTO: 0.03 K/UL — SIGNIFICANT CHANGE UP (ref 0–0.5)
EOSINOPHIL NFR BLD AUTO: 0.2 % — SIGNIFICANT CHANGE UP (ref 0–6)
GLUCOSE SERPL-MCNC: 77 MG/DL — SIGNIFICANT CHANGE UP (ref 70–99)
HBV SURFACE AG SER-ACNC: SIGNIFICANT CHANGE UP
HCT VFR BLD CALC: 29.6 % — LOW (ref 34.5–45)
HGB BLD-MCNC: 9.4 G/DL — LOW (ref 11.5–15.5)
IMM GRANULOCYTES # BLD AUTO: 0.08 K/UL — HIGH (ref 0–0.07)
IMM GRANULOCYTES NFR BLD AUTO: 0.5 % — SIGNIFICANT CHANGE UP (ref 0–0.9)
LDH SERPL L TO P-CCNC: 222 U/L — SIGNIFICANT CHANGE UP (ref 135–225)
LYMPHOCYTES # BLD AUTO: 2.31 K/UL — SIGNIFICANT CHANGE UP (ref 1–3.3)
LYMPHOCYTES NFR BLD AUTO: 13.9 % — SIGNIFICANT CHANGE UP (ref 13–44)
MCHC RBC-ENTMCNC: 27.1 PG — SIGNIFICANT CHANGE UP (ref 27–34)
MCHC RBC-ENTMCNC: 31.8 G/DL — LOW (ref 32–36)
MCV RBC AUTO: 85.3 FL — SIGNIFICANT CHANGE UP (ref 80–100)
MONOCYTES # BLD AUTO: 1.41 K/UL — HIGH (ref 0–0.9)
MONOCYTES NFR BLD AUTO: 8.5 % — SIGNIFICANT CHANGE UP (ref 2–14)
NEUTROPHILS # BLD AUTO: 12.77 K/UL — HIGH (ref 1.8–7.4)
NEUTROPHILS NFR BLD AUTO: 76.6 % — SIGNIFICANT CHANGE UP (ref 43–77)
NRBC # BLD AUTO: 0 K/UL — SIGNIFICANT CHANGE UP (ref 0–0)
NRBC # FLD: 0 K/UL — SIGNIFICANT CHANGE UP (ref 0–0)
NRBC BLD AUTO-RTO: 0 /100 WBCS — SIGNIFICANT CHANGE UP (ref 0–0)
PLATELET # BLD AUTO: 300 K/UL — SIGNIFICANT CHANGE UP (ref 150–400)
PMV BLD: 9.2 FL — SIGNIFICANT CHANGE UP (ref 7–13)
POTASSIUM SERPL-MCNC: 3.5 MMOL/L — SIGNIFICANT CHANGE UP (ref 3.5–5.3)
POTASSIUM SERPL-SCNC: 3.5 MMOL/L — SIGNIFICANT CHANGE UP (ref 3.5–5.3)
PROT SERPL-MCNC: 5.4 G/DL — LOW (ref 6–8.3)
RBC # BLD: 3.47 M/UL — LOW (ref 3.8–5.2)
RBC # FLD: 14.6 % — HIGH (ref 10.3–14.5)
SODIUM SERPL-SCNC: 135 MMOL/L — SIGNIFICANT CHANGE UP (ref 135–145)
T PALLIDUM AB TITR SER: NEGATIVE — SIGNIFICANT CHANGE UP
URATE SERPL-MCNC: 3.2 MG/DL — SIGNIFICANT CHANGE UP (ref 2.5–7)
WBC # BLD: 16.65 K/UL — HIGH (ref 3.8–10.5)
WBC # FLD AUTO: 16.65 K/UL — HIGH (ref 3.8–10.5)

## 2025-08-19 RX ORDER — IBUPROFEN 200 MG
600 TABLET ORAL EVERY 6 HOURS
Refills: 0 | Status: DISCONTINUED | OUTPATIENT
Start: 2025-08-19 | End: 2025-08-21

## 2025-08-19 RX ADMIN — Medication 975 MILLIGRAM(S): at 14:19

## 2025-08-19 RX ADMIN — Medication 975 MILLIGRAM(S): at 15:00

## 2025-08-19 RX ADMIN — Medication 325 MILLIGRAM(S): at 11:27

## 2025-08-19 RX ADMIN — Medication 80 MILLIGRAM(S): at 20:16

## 2025-08-19 RX ADMIN — HEPARIN SODIUM 10000 UNIT(S): 1000 INJECTION INTRAVENOUS; SUBCUTANEOUS at 14:20

## 2025-08-19 RX ADMIN — Medication 600 MILLIGRAM(S): at 23:10

## 2025-08-19 RX ADMIN — Medication 600 MILLIGRAM(S): at 17:50

## 2025-08-19 RX ADMIN — KETOROLAC TROMETHAMINE 30 MILLIGRAM(S): 30 INJECTION, SOLUTION INTRAMUSCULAR; INTRAVENOUS at 06:40

## 2025-08-19 RX ADMIN — Medication 975 MILLIGRAM(S): at 21:16

## 2025-08-19 RX ADMIN — Medication 2 TABLET(S): at 20:16

## 2025-08-19 RX ADMIN — Medication 500 MILLIGRAM(S): at 11:27

## 2025-08-19 RX ADMIN — KETOROLAC TROMETHAMINE 30 MILLIGRAM(S): 30 INJECTION, SOLUTION INTRAMUSCULAR; INTRAVENOUS at 00:10

## 2025-08-19 RX ADMIN — HEPARIN SODIUM 10000 UNIT(S): 1000 INJECTION INTRAVENOUS; SUBCUTANEOUS at 02:28

## 2025-08-19 RX ADMIN — KETOROLAC TROMETHAMINE 30 MILLIGRAM(S): 30 INJECTION, SOLUTION INTRAMUSCULAR; INTRAVENOUS at 12:00

## 2025-08-19 RX ADMIN — KETOROLAC TROMETHAMINE 30 MILLIGRAM(S): 30 INJECTION, SOLUTION INTRAMUSCULAR; INTRAVENOUS at 06:01

## 2025-08-19 RX ADMIN — Medication 975 MILLIGRAM(S): at 20:16

## 2025-08-19 RX ADMIN — Medication 600 MILLIGRAM(S): at 18:39

## 2025-08-19 RX ADMIN — KETOROLAC TROMETHAMINE 30 MILLIGRAM(S): 30 INJECTION, SOLUTION INTRAMUSCULAR; INTRAVENOUS at 11:28

## 2025-08-20 DIAGNOSIS — O26.899 OTHER SPECIFIED PREGNANCY RELATED CONDITIONS, UNSPECIFIED TRIMESTER: ICD-10-CM

## 2025-08-20 PROCEDURE — 99232 SBSQ HOSP IP/OBS MODERATE 35: CPT

## 2025-08-20 RX ORDER — IBUPROFEN 200 MG
1 TABLET ORAL
Qty: 0 | Refills: 0 | DISCHARGE
Start: 2025-08-20

## 2025-08-20 RX ORDER — OXYCODONE HYDROCHLORIDE 30 MG/1
5 TABLET ORAL
Refills: 0 | Status: DISCONTINUED | OUTPATIENT
Start: 2025-08-20 | End: 2025-08-21

## 2025-08-20 RX ORDER — SENNA 187 MG
2 TABLET ORAL
Qty: 0 | Refills: 0 | DISCHARGE
Start: 2025-08-20

## 2025-08-20 RX ORDER — ACETAMINOPHEN 500 MG/5ML
3 LIQUID (ML) ORAL
Qty: 0 | Refills: 0 | DISCHARGE
Start: 2025-08-20

## 2025-08-20 RX ADMIN — OXYCODONE HYDROCHLORIDE 5 MILLIGRAM(S): 30 TABLET ORAL at 11:27

## 2025-08-20 RX ADMIN — Medication 80 MILLIGRAM(S): at 05:17

## 2025-08-20 RX ADMIN — Medication 975 MILLIGRAM(S): at 10:27

## 2025-08-20 RX ADMIN — Medication 600 MILLIGRAM(S): at 06:18

## 2025-08-20 RX ADMIN — OXYCODONE HYDROCHLORIDE 5 MILLIGRAM(S): 30 TABLET ORAL at 18:00

## 2025-08-20 RX ADMIN — Medication 975 MILLIGRAM(S): at 16:59

## 2025-08-20 RX ADMIN — Medication 600 MILLIGRAM(S): at 14:10

## 2025-08-20 RX ADMIN — OXYCODONE HYDROCHLORIDE 5 MILLIGRAM(S): 30 TABLET ORAL at 17:00

## 2025-08-20 RX ADMIN — OXYCODONE HYDROCHLORIDE 5 MILLIGRAM(S): 30 TABLET ORAL at 10:27

## 2025-08-20 RX ADMIN — Medication 600 MILLIGRAM(S): at 20:16

## 2025-08-20 RX ADMIN — Medication 600 MILLIGRAM(S): at 13:10

## 2025-08-20 RX ADMIN — Medication 600 MILLIGRAM(S): at 05:18

## 2025-08-20 RX ADMIN — Medication 325 MILLIGRAM(S): at 13:09

## 2025-08-20 RX ADMIN — OXYCODONE HYDROCHLORIDE 5 MILLIGRAM(S): 30 TABLET ORAL at 21:15

## 2025-08-20 RX ADMIN — OXYCODONE HYDROCHLORIDE 5 MILLIGRAM(S): 30 TABLET ORAL at 05:22

## 2025-08-20 RX ADMIN — Medication 600 MILLIGRAM(S): at 21:15

## 2025-08-20 RX ADMIN — Medication 975 MILLIGRAM(S): at 18:00

## 2025-08-20 RX ADMIN — OXYCODONE HYDROCHLORIDE 5 MILLIGRAM(S): 30 TABLET ORAL at 06:18

## 2025-08-20 RX ADMIN — MAGNESIUM HYDROXIDE 30 MILLILITER(S): 400 SUSPENSION ORAL at 16:59

## 2025-08-20 RX ADMIN — Medication 600 MILLIGRAM(S): at 00:10

## 2025-08-20 RX ADMIN — Medication 80 MILLIGRAM(S): at 10:27

## 2025-08-20 RX ADMIN — Medication 500 MILLIGRAM(S): at 13:09

## 2025-08-20 RX ADMIN — Medication 975 MILLIGRAM(S): at 11:27

## 2025-08-20 RX ADMIN — OXYCODONE HYDROCHLORIDE 5 MILLIGRAM(S): 30 TABLET ORAL at 20:16

## 2025-08-20 RX ADMIN — HEPARIN SODIUM 10000 UNIT(S): 1000 INJECTION INTRAVENOUS; SUBCUTANEOUS at 13:10

## 2025-08-21 VITALS
TEMPERATURE: 98 F | SYSTOLIC BLOOD PRESSURE: 112 MMHG | HEART RATE: 75 BPM | RESPIRATION RATE: 17 BRPM | OXYGEN SATURATION: 99 % | DIASTOLIC BLOOD PRESSURE: 69 MMHG

## 2025-08-21 PROCEDURE — 99239 HOSP IP/OBS DSCHRG MGMT >30: CPT

## 2025-08-21 RX ORDER — MEDROXYPROGESTERONE ACETATE 10 MG
150 TABLET ORAL ONCE
Refills: 0 | Status: COMPLETED | OUTPATIENT
Start: 2025-08-21 | End: 2025-08-21

## 2025-08-21 RX ORDER — CABERGOLINE 0.5 MG/1
1 TABLET ORAL ONCE
Refills: 0 | Status: DISCONTINUED | OUTPATIENT
Start: 2025-08-21 | End: 2025-08-21

## 2025-08-21 RX ORDER — CABERGOLINE 0.5 MG/1
1 TABLET ORAL ONCE
Refills: 0 | Status: COMPLETED | OUTPATIENT
Start: 2025-08-21 | End: 2025-08-21

## 2025-08-21 RX ORDER — POLYETHYLENE GLYCOL 3350 17 G/17G
17 POWDER, FOR SOLUTION ORAL ONCE
Refills: 0 | Status: DISCONTINUED | OUTPATIENT
Start: 2025-08-21 | End: 2025-08-21

## 2025-08-21 RX ORDER — OXYCODONE HYDROCHLORIDE 30 MG/1
1 TABLET ORAL
Qty: 12 | Refills: 0
Start: 2025-08-21 | End: 2025-08-23

## 2025-08-21 RX ORDER — POLYETHYLENE GLYCOL 3350 17 G/17G
17 POWDER, FOR SOLUTION ORAL ONCE
Refills: 0 | Status: COMPLETED | OUTPATIENT
Start: 2025-08-21 | End: 2025-08-21

## 2025-08-21 RX ADMIN — Medication 600 MILLIGRAM(S): at 09:32

## 2025-08-21 RX ADMIN — OXYCODONE HYDROCHLORIDE 5 MILLIGRAM(S): 30 TABLET ORAL at 02:28

## 2025-08-21 RX ADMIN — HEPARIN SODIUM 10000 UNIT(S): 1000 INJECTION INTRAVENOUS; SUBCUTANEOUS at 11:22

## 2025-08-21 RX ADMIN — OXYCODONE HYDROCHLORIDE 5 MILLIGRAM(S): 30 TABLET ORAL at 11:52

## 2025-08-21 RX ADMIN — Medication 975 MILLIGRAM(S): at 09:02

## 2025-08-21 RX ADMIN — OXYCODONE HYDROCHLORIDE 5 MILLIGRAM(S): 30 TABLET ORAL at 14:49

## 2025-08-21 RX ADMIN — Medication 150 MILLIGRAM(S): at 14:55

## 2025-08-21 RX ADMIN — Medication 600 MILLIGRAM(S): at 09:02

## 2025-08-21 RX ADMIN — OXYCODONE HYDROCHLORIDE 5 MILLIGRAM(S): 30 TABLET ORAL at 11:22

## 2025-08-21 RX ADMIN — OXYCODONE HYDROCHLORIDE 5 MILLIGRAM(S): 30 TABLET ORAL at 01:01

## 2025-08-21 RX ADMIN — Medication 975 MILLIGRAM(S): at 15:16

## 2025-08-21 RX ADMIN — Medication 975 MILLIGRAM(S): at 01:00

## 2025-08-21 RX ADMIN — Medication 80 MILLIGRAM(S): at 12:53

## 2025-08-21 RX ADMIN — CABERGOLINE 1 MILLIGRAM(S): 0.5 TABLET ORAL at 14:41

## 2025-08-21 RX ADMIN — Medication 975 MILLIGRAM(S): at 09:32

## 2025-08-21 RX ADMIN — Medication 2 TABLET(S): at 01:01

## 2025-08-21 RX ADMIN — Medication 975 MILLIGRAM(S): at 14:46

## 2025-08-21 RX ADMIN — Medication 975 MILLIGRAM(S): at 02:28

## 2025-08-21 RX ADMIN — POLYETHYLENE GLYCOL 3350 17 GRAM(S): 17 POWDER, FOR SOLUTION ORAL at 09:54

## 2025-08-21 RX ADMIN — OXYCODONE HYDROCHLORIDE 5 MILLIGRAM(S): 30 TABLET ORAL at 15:19

## 2025-08-21 RX ADMIN — HEPARIN SODIUM 10000 UNIT(S): 1000 INJECTION INTRAVENOUS; SUBCUTANEOUS at 01:01

## 2025-08-21 RX ADMIN — Medication 500 MILLIGRAM(S): at 11:22

## 2025-08-25 ENCOUNTER — APPOINTMENT (OUTPATIENT)
Dept: MATERNAL FETAL MEDICINE | Facility: HOSPITAL | Age: 35
End: 2025-08-25

## 2025-08-28 ENCOUNTER — APPOINTMENT (OUTPATIENT)
Dept: OBGYN | Facility: HOSPITAL | Age: 35
End: 2025-08-28
Payer: MEDICAID

## 2025-08-28 ENCOUNTER — OUTPATIENT (OUTPATIENT)
Dept: OUTPATIENT SERVICES | Facility: HOSPITAL | Age: 35
LOS: 1 days | End: 2025-08-28

## 2025-08-28 ENCOUNTER — EMERGENCY (EMERGENCY)
Facility: HOSPITAL | Age: 35
LOS: 1 days | End: 2025-08-28
Admitting: EMERGENCY MEDICINE
Payer: MEDICAID

## 2025-08-28 VITALS
WEIGHT: 225 LBS | HEIGHT: 64 IN | SYSTOLIC BLOOD PRESSURE: 129 MMHG | TEMPERATURE: 97.9 F | DIASTOLIC BLOOD PRESSURE: 89 MMHG | HEART RATE: 78 BPM | BODY MASS INDEX: 38.41 KG/M2

## 2025-08-28 VITALS
RESPIRATION RATE: 16 BRPM | SYSTOLIC BLOOD PRESSURE: 134 MMHG | OXYGEN SATURATION: 100 % | DIASTOLIC BLOOD PRESSURE: 87 MMHG | TEMPERATURE: 99 F | HEART RATE: 72 BPM

## 2025-08-28 VITALS
DIASTOLIC BLOOD PRESSURE: 91 MMHG | OXYGEN SATURATION: 99 % | RESPIRATION RATE: 16 BRPM | HEART RATE: 77 BPM | HEIGHT: 64 IN | SYSTOLIC BLOOD PRESSURE: 130 MMHG | TEMPERATURE: 98 F

## 2025-08-28 DIAGNOSIS — Z76.81 EXPECTANT PARENT(S) PREBIRTH PEDIATRICIAN VISIT: ICD-10-CM

## 2025-08-28 DIAGNOSIS — Z01.30 ENCOUNTER FOR EXAMINATION OF BLOOD PRESSURE W/OUT ABNORMAL FINDINGS: ICD-10-CM

## 2025-08-28 DIAGNOSIS — T14.8XXA OTHER INJURY OF UNSPECIFIED BODY REGION, INITIAL ENCOUNTER: ICD-10-CM

## 2025-08-28 DIAGNOSIS — Z01.30 ENCOUNTER FOR EXAMINATION OF BLOOD PRESSURE WITHOUT ABNORMAL FINDINGS: ICD-10-CM

## 2025-08-28 DIAGNOSIS — Z98.891 HISTORY OF UTERINE SCAR FROM PREVIOUS SURGERY: ICD-10-CM

## 2025-08-28 DIAGNOSIS — O28.5 ABNORMAL CHROMOSOMAL AND GENETIC FINDING ON ANTENATAL SCREENING OF MOTHER: ICD-10-CM

## 2025-08-28 DIAGNOSIS — K42.0 UMBILICAL HERNIA WITH OBSTRUCTION, W/OUT GANGRENE: ICD-10-CM

## 2025-08-28 DIAGNOSIS — Z98.890 OTHER SPECIFIED POSTPROCEDURAL STATES: Chronic | ICD-10-CM

## 2025-08-28 DIAGNOSIS — Z98.891 HISTORY OF UTERINE SCAR FROM PREVIOUS SURGERY: Chronic | ICD-10-CM

## 2025-08-28 DIAGNOSIS — O99.210 OBESITY COMPLICATING PREGNANCY, UNSPECIFIED TRIMESTER: ICD-10-CM

## 2025-08-28 DIAGNOSIS — Z51.89 ENCOUNTER FOR OTHER SPECIFIED AFTERCARE: ICD-10-CM

## 2025-08-28 DIAGNOSIS — O35.EXX0 MATERNAL CARE FOR OTHER (SUSPECTED) FETAL ABNORMALITY AND DAMAGE, FETAL GENITOURINARY ANOMALIES, NOT APPLICABLE OR UNSPECIFIED: ICD-10-CM

## 2025-08-28 DIAGNOSIS — O09.93 SUPERVISION OF HIGH RISK PREGNANCY, UNSPECIFIED, THIRD TRIMESTER: ICD-10-CM

## 2025-08-28 DIAGNOSIS — O43.109 MALFORMATION OF PLACENTA, UNSPECIFIED, UNSPECIFIED TRIMESTER: ICD-10-CM

## 2025-08-28 DIAGNOSIS — O35.9XX0 MATERNAL CARE FOR (SUSPECTED) FETAL ABNORMALITY AND DAMAGE, UNSPECIFIED, NOT APPLICABLE OR UNSPECIFIED: ICD-10-CM

## 2025-08-28 DIAGNOSIS — L08.9 OTHER INJURY OF UNSPECIFIED BODY REGION, INITIAL ENCOUNTER: ICD-10-CM

## 2025-08-28 LAB
ALBUMIN SERPL ELPH-MCNC: 3.3 G/DL — SIGNIFICANT CHANGE UP (ref 3.3–5)
ALP SERPL-CCNC: 61 U/L — SIGNIFICANT CHANGE UP (ref 40–120)
ALT FLD-CCNC: 10 U/L — SIGNIFICANT CHANGE UP (ref 4–33)
ANION GAP SERPL CALC-SCNC: 10 MMOL/L — SIGNIFICANT CHANGE UP (ref 7–14)
APPEARANCE UR: CLEAR — SIGNIFICANT CHANGE UP
AST SERPL-CCNC: 10 U/L — SIGNIFICANT CHANGE UP (ref 4–32)
BACTERIA # UR AUTO: NEGATIVE /HPF — SIGNIFICANT CHANGE UP
BASOPHILS # BLD AUTO: 0.02 K/UL — SIGNIFICANT CHANGE UP (ref 0–0.2)
BASOPHILS NFR BLD AUTO: 0.2 % — SIGNIFICANT CHANGE UP (ref 0–2)
BILIRUB SERPL-MCNC: 0.3 MG/DL — SIGNIFICANT CHANGE UP (ref 0.2–1.2)
BILIRUB UR-MCNC: NEGATIVE — SIGNIFICANT CHANGE UP
BUN SERPL-MCNC: 13 MG/DL — SIGNIFICANT CHANGE UP (ref 7–23)
CALCIUM SERPL-MCNC: 8.8 MG/DL — SIGNIFICANT CHANGE UP (ref 8.4–10.5)
CAST: 0 /LPF — SIGNIFICANT CHANGE UP (ref 0–4)
CHLORIDE SERPL-SCNC: 106 MMOL/L — SIGNIFICANT CHANGE UP (ref 98–107)
CO2 SERPL-SCNC: 23 MMOL/L — SIGNIFICANT CHANGE UP (ref 22–31)
COLOR SPEC: YELLOW — SIGNIFICANT CHANGE UP
CREAT SERPL-MCNC: 0.8 MG/DL — SIGNIFICANT CHANGE UP (ref 0.5–1.3)
DIFF PNL FLD: ABNORMAL
EGFR: 97 ML/MIN/1.73M2 — SIGNIFICANT CHANGE UP
EGFR: 97 ML/MIN/1.73M2 — SIGNIFICANT CHANGE UP
EOSINOPHIL # BLD AUTO: 0.12 K/UL — SIGNIFICANT CHANGE UP (ref 0–0.5)
EOSINOPHIL NFR BLD AUTO: 1.2 % — SIGNIFICANT CHANGE UP (ref 0–6)
GLUCOSE SERPL-MCNC: 102 MG/DL — HIGH (ref 70–99)
GLUCOSE UR QL: NEGATIVE MG/DL — SIGNIFICANT CHANGE UP
HCT VFR BLD CALC: 29.8 % — LOW (ref 34.5–45)
HGB BLD-MCNC: 9.5 G/DL — LOW (ref 11.5–15.5)
IMM GRANULOCYTES # BLD AUTO: 0.03 K/UL — SIGNIFICANT CHANGE UP (ref 0–0.07)
IMM GRANULOCYTES NFR BLD AUTO: 0.3 % — SIGNIFICANT CHANGE UP (ref 0–0.9)
KETONES UR QL: NEGATIVE MG/DL — SIGNIFICANT CHANGE UP
LEUKOCYTE ESTERASE UR-ACNC: NEGATIVE — SIGNIFICANT CHANGE UP
LYMPHOCYTES # BLD AUTO: 1.56 K/UL — SIGNIFICANT CHANGE UP (ref 1–3.3)
LYMPHOCYTES NFR BLD AUTO: 15.7 % — SIGNIFICANT CHANGE UP (ref 13–44)
MCHC RBC-ENTMCNC: 27.4 PG — SIGNIFICANT CHANGE UP (ref 27–34)
MCHC RBC-ENTMCNC: 31.9 G/DL — LOW (ref 32–36)
MCV RBC AUTO: 85.9 FL — SIGNIFICANT CHANGE UP (ref 80–100)
MONOCYTES # BLD AUTO: 0.6 K/UL — SIGNIFICANT CHANGE UP (ref 0–0.9)
MONOCYTES NFR BLD AUTO: 6 % — SIGNIFICANT CHANGE UP (ref 2–14)
NEUTROPHILS # BLD AUTO: 7.61 K/UL — HIGH (ref 1.8–7.4)
NEUTROPHILS NFR BLD AUTO: 76.6 % — SIGNIFICANT CHANGE UP (ref 43–77)
NITRITE UR-MCNC: NEGATIVE — SIGNIFICANT CHANGE UP
NRBC # BLD AUTO: 0 K/UL — SIGNIFICANT CHANGE UP (ref 0–0)
NRBC # FLD: 0 K/UL — SIGNIFICANT CHANGE UP (ref 0–0)
NRBC BLD AUTO-RTO: 0 /100 WBCS — SIGNIFICANT CHANGE UP (ref 0–0)
PH UR: 7 — SIGNIFICANT CHANGE UP (ref 5–8)
PLATELET # BLD AUTO: 496 K/UL — HIGH (ref 150–400)
PMV BLD: 8.5 FL — SIGNIFICANT CHANGE UP (ref 7–13)
POTASSIUM SERPL-MCNC: 3.5 MMOL/L — SIGNIFICANT CHANGE UP (ref 3.5–5.3)
POTASSIUM SERPL-SCNC: 3.5 MMOL/L — SIGNIFICANT CHANGE UP (ref 3.5–5.3)
PROT SERPL-MCNC: 6.7 G/DL — SIGNIFICANT CHANGE UP (ref 6–8.3)
PROT UR-MCNC: SIGNIFICANT CHANGE UP MG/DL
RBC # BLD: 3.47 M/UL — LOW (ref 3.8–5.2)
RBC # FLD: 14.5 % — SIGNIFICANT CHANGE UP (ref 10.3–14.5)
RBC CASTS # UR COMP ASSIST: 56 /HPF — HIGH (ref 0–4)
SODIUM SERPL-SCNC: 139 MMOL/L — SIGNIFICANT CHANGE UP (ref 135–145)
SP GR SPEC: 1.07 — HIGH (ref 1–1.03)
SQUAMOUS # UR AUTO: 1 /HPF — SIGNIFICANT CHANGE UP (ref 0–5)
SURGICAL PATHOLOGY STUDY: SIGNIFICANT CHANGE UP
UROBILINOGEN FLD QL: 1 MG/DL — SIGNIFICANT CHANGE UP (ref 0.2–1)
WBC # BLD: 9.94 K/UL — SIGNIFICANT CHANGE UP (ref 3.8–10.5)
WBC # FLD AUTO: 9.94 K/UL — SIGNIFICANT CHANGE UP (ref 3.8–10.5)
WBC UR QL: 8 /HPF — HIGH (ref 0–5)

## 2025-08-28 PROCEDURE — 99285 EMERGENCY DEPT VISIT HI MDM: CPT

## 2025-08-28 PROCEDURE — 99203 OFFICE O/P NEW LOW 30 MIN: CPT

## 2025-08-28 PROCEDURE — 74177 CT ABD & PELVIS W/CONTRAST: CPT | Mod: 26

## 2025-08-28 RX ORDER — KETOROLAC TROMETHAMINE 30 MG/ML
15 INJECTION, SOLUTION INTRAMUSCULAR; INTRAVENOUS ONCE
Refills: 0 | Status: DISCONTINUED | OUTPATIENT
Start: 2025-08-28 | End: 2025-08-28

## 2025-08-28 RX ORDER — OXYCODONE HYDROCHLORIDE AND ACETAMINOPHEN 10; 325 MG/1; MG/1
1 TABLET ORAL
Qty: 12 | Refills: 0
Start: 2025-08-28 | End: 2025-08-30

## 2025-08-28 RX ADMIN — Medication 1000 MILLILITER(S): at 16:39

## 2025-08-28 RX ADMIN — KETOROLAC TROMETHAMINE 15 MILLIGRAM(S): 30 INJECTION, SOLUTION INTRAMUSCULAR; INTRAVENOUS at 19:05

## 2025-08-28 RX ADMIN — Medication 1000 MILLILITER(S): at 15:39

## 2025-08-28 RX ADMIN — KETOROLAC TROMETHAMINE 15 MILLIGRAM(S): 30 INJECTION, SOLUTION INTRAMUSCULAR; INTRAVENOUS at 18:35

## 2025-08-30 LAB
CULTURE RESULTS: SIGNIFICANT CHANGE UP
SPECIMEN SOURCE: SIGNIFICANT CHANGE UP

## 2025-09-03 ENCOUNTER — RESULT REVIEW (OUTPATIENT)
Age: 35
End: 2025-09-03

## 2025-09-03 ENCOUNTER — APPOINTMENT (OUTPATIENT)
Dept: OBGYN | Facility: HOSPITAL | Age: 35
End: 2025-09-03
Payer: MEDICAID

## 2025-09-03 ENCOUNTER — OUTPATIENT (OUTPATIENT)
Dept: OUTPATIENT SERVICES | Facility: HOSPITAL | Age: 35
LOS: 1 days | End: 2025-09-03

## 2025-09-03 VITALS
HEIGHT: 64 IN | SYSTOLIC BLOOD PRESSURE: 128 MMHG | WEIGHT: 221 LBS | BODY MASS INDEX: 37.73 KG/M2 | HEART RATE: 88 BPM | TEMPERATURE: 98.1 F | DIASTOLIC BLOOD PRESSURE: 86 MMHG

## 2025-09-03 DIAGNOSIS — Z98.891 HISTORY OF UTERINE SCAR FROM PREVIOUS SURGERY: Chronic | ICD-10-CM

## 2025-09-03 DIAGNOSIS — Z98.890 OTHER SPECIFIED POSTPROCEDURAL STATES: Chronic | ICD-10-CM

## 2025-09-03 LAB
ALBUMIN SERPL ELPH-MCNC: 3.5 G/DL — SIGNIFICANT CHANGE UP (ref 3.3–5)
ALP SERPL-CCNC: 68 U/L — SIGNIFICANT CHANGE UP (ref 40–120)
ALT FLD-CCNC: 10 U/L — SIGNIFICANT CHANGE UP (ref 4–33)
ANION GAP SERPL CALC-SCNC: 9 MMOL/L — SIGNIFICANT CHANGE UP (ref 7–14)
AST SERPL-CCNC: 11 U/L — SIGNIFICANT CHANGE UP (ref 4–32)
BASOPHILS # BLD AUTO: 0.05 K/UL — SIGNIFICANT CHANGE UP (ref 0–0.2)
BASOPHILS NFR BLD AUTO: 0.7 % — SIGNIFICANT CHANGE UP (ref 0–2)
BILIRUB SERPL-MCNC: 0.2 MG/DL — SIGNIFICANT CHANGE UP (ref 0.2–1.2)
BUN SERPL-MCNC: 12 MG/DL — SIGNIFICANT CHANGE UP (ref 7–23)
CALCIUM SERPL-MCNC: 8.8 MG/DL — SIGNIFICANT CHANGE UP (ref 8.4–10.5)
CHLORIDE SERPL-SCNC: 106 MMOL/L — SIGNIFICANT CHANGE UP (ref 98–107)
CO2 SERPL-SCNC: 26 MMOL/L — SIGNIFICANT CHANGE UP (ref 22–31)
CREAT SERPL-MCNC: 0.89 MG/DL — SIGNIFICANT CHANGE UP (ref 0.5–1.3)
EGFR: 86 ML/MIN/1.73M2 — SIGNIFICANT CHANGE UP
EGFR: 86 ML/MIN/1.73M2 — SIGNIFICANT CHANGE UP
EOSINOPHIL # BLD AUTO: 0.14 K/UL — SIGNIFICANT CHANGE UP (ref 0–0.5)
EOSINOPHIL NFR BLD AUTO: 1.8 % — SIGNIFICANT CHANGE UP (ref 0–6)
GLUCOSE SERPL-MCNC: 73 MG/DL — SIGNIFICANT CHANGE UP (ref 70–99)
HCT VFR BLD CALC: 30.8 % — LOW (ref 34.5–45)
HGB BLD-MCNC: 9.6 G/DL — LOW (ref 11.5–15.5)
IMM GRANULOCYTES # BLD AUTO: 0.07 K/UL — SIGNIFICANT CHANGE UP (ref 0–0.07)
IMM GRANULOCYTES NFR BLD AUTO: 0.9 % — SIGNIFICANT CHANGE UP (ref 0–0.9)
LYMPHOCYTES # BLD AUTO: 1.76 K/UL — SIGNIFICANT CHANGE UP (ref 1–3.3)
LYMPHOCYTES NFR BLD AUTO: 23.2 % — SIGNIFICANT CHANGE UP (ref 13–44)
MCHC RBC-ENTMCNC: 26.4 PG — LOW (ref 27–34)
MCHC RBC-ENTMCNC: 31.2 G/DL — LOW (ref 32–36)
MCV RBC AUTO: 84.6 FL — SIGNIFICANT CHANGE UP (ref 80–100)
MONOCYTES # BLD AUTO: 0.55 K/UL — SIGNIFICANT CHANGE UP (ref 0–0.9)
MONOCYTES NFR BLD AUTO: 7.3 % — SIGNIFICANT CHANGE UP (ref 2–14)
NEUTROPHILS # BLD AUTO: 5 K/UL — SIGNIFICANT CHANGE UP (ref 1.8–7.4)
NEUTROPHILS NFR BLD AUTO: 66.1 % — SIGNIFICANT CHANGE UP (ref 43–77)
NRBC # BLD AUTO: 0 K/UL — SIGNIFICANT CHANGE UP (ref 0–0)
NRBC # FLD: 0 K/UL — SIGNIFICANT CHANGE UP (ref 0–0)
NRBC BLD AUTO-RTO: 0 /100 WBCS — SIGNIFICANT CHANGE UP (ref 0–0)
PLATELET # BLD AUTO: 517 K/UL — HIGH (ref 150–400)
PMV BLD: 8.8 FL — SIGNIFICANT CHANGE UP (ref 7–13)
POTASSIUM SERPL-MCNC: 3.3 MMOL/L — LOW (ref 3.5–5.3)
POTASSIUM SERPL-SCNC: 3.3 MMOL/L — LOW (ref 3.5–5.3)
PROT SERPL-MCNC: 7 G/DL — SIGNIFICANT CHANGE UP (ref 6–8.3)
RBC # BLD: 3.64 M/UL — LOW (ref 3.8–5.2)
RBC # FLD: 14.2 % — SIGNIFICANT CHANGE UP (ref 10.3–14.5)
SODIUM SERPL-SCNC: 141 MMOL/L — SIGNIFICANT CHANGE UP (ref 135–145)
WBC # BLD: 7.57 K/UL — SIGNIFICANT CHANGE UP (ref 3.8–10.5)
WBC # FLD AUTO: 7.57 K/UL — SIGNIFICANT CHANGE UP (ref 3.8–10.5)

## 2025-09-03 PROCEDURE — 99213 OFFICE O/P EST LOW 20 MIN: CPT

## 2025-09-04 DIAGNOSIS — N94.9 UNSPECIFIED CONDITION ASSOCIATED WITH FEMALE GENITAL ORGANS AND MENSTRUAL CYCLE: ICD-10-CM

## 2025-09-04 DIAGNOSIS — K59.00 CONSTIPATION, UNSPECIFIED: ICD-10-CM

## 2025-09-04 DIAGNOSIS — Z48.89 ENCOUNTER FOR OTHER SPECIFIED SURGICAL AFTERCARE: ICD-10-CM

## 2025-09-04 RX ORDER — POLYETHYLENE GLYCOL 3350 17 G/17G
17 POWDER, FOR SOLUTION ORAL DAILY
Qty: 1 | Refills: 1 | Status: ACTIVE | COMMUNITY
Start: 2025-09-04 | End: 1900-01-01

## 2025-09-04 RX ORDER — CHLORHEXIDINE GLUCONATE 4 %
325 (65 FE) LIQUID (ML) TOPICAL DAILY
Qty: 30 | Refills: 5 | Status: ACTIVE | COMMUNITY
Start: 2025-09-04 | End: 2026-03-03

## 2025-09-08 ENCOUNTER — APPOINTMENT (OUTPATIENT)
Dept: MATERNAL FETAL MEDICINE | Facility: HOSPITAL | Age: 35
End: 2025-09-08

## 2025-09-24 PROBLEM — Z01.818 PREOP TESTING: Status: RESOLVED | Noted: 2021-01-08 | Resolved: 2025-09-24

## 2025-09-24 PROBLEM — Z01.30 BLOOD PRESSURE CHECK: Status: RESOLVED | Noted: 2025-08-28 | Resolved: 2025-09-24

## 2025-09-24 PROBLEM — Z86.19 HISTORY OF WOUND INFECTION: Status: RESOLVED | Noted: 2025-08-28 | Resolved: 2025-09-24

## 2025-09-24 PROBLEM — Z51.89 VISIT FOR WOUND CHECK: Status: RESOLVED | Noted: 2025-08-28 | Resolved: 2025-09-24

## (undated) DEVICE — STAPLER SKIN MULTI DIRECTION W35

## (undated) DEVICE — DRAPE LIGHT HANDLE COVER (GREEN)

## (undated) DEVICE — SYR LUER LOK 1CC

## (undated) DEVICE — SUT SILK 0 24" SH DA

## (undated) DEVICE — DRAPE WARMING SOLUTION 44 X 44"

## (undated) DEVICE — SUT CHROMIC 0 36" CTX

## (undated) DEVICE — ELCTR BOVIE PENCIL SMOKE EVACUATION

## (undated) DEVICE — PACK L&D C SECTION

## (undated) DEVICE — BAG RESUSITATOR MASK MANOMETER INF

## (undated) DEVICE — SUT VICRYL 0 36" CT-1 UNDYED

## (undated) DEVICE — WARMING BLANKET FULL ADULT

## (undated) DEVICE — SUT CHROMIC 1 36" CTX

## (undated) DEVICE — DRAPE TOWEL BLUE 17" X 24"

## (undated) DEVICE — SUT SILK 0 30" PSL

## (undated) DEVICE — DRSG CURITY GAUZE SPONGE 4 X 4" 12-PLY

## (undated) DEVICE — GOWN XL

## (undated) DEVICE — LIGASURE IMPACT

## (undated) DEVICE — FOLEY CATH PLUG

## (undated) DEVICE — FOLEY CATH 3-WAY 18FR 30CC LATEX LUBRICATH

## (undated) DEVICE — ELCTR GROUNDING PAD INFANT COVIDIEN

## (undated) DEVICE — Device

## (undated) DEVICE — TUBING SUCTION NONCONDUCTIVE 6MM X 12FT

## (undated) DEVICE — SUT QUILL MONODERM 3-0 30CM 19MM

## (undated) DEVICE — VENODYNE/SCD SLEEVE CALF MEDIUM